# Patient Record
Sex: FEMALE | Race: WHITE | NOT HISPANIC OR LATINO | Employment: OTHER | URBAN - METROPOLITAN AREA
[De-identification: names, ages, dates, MRNs, and addresses within clinical notes are randomized per-mention and may not be internally consistent; named-entity substitution may affect disease eponyms.]

---

## 2023-02-15 PROBLEM — F32.A DEPRESSION: Status: ACTIVE | Noted: 2023-02-15

## 2023-02-15 PROBLEM — G89.29 CHRONIC BACK PAIN: Status: ACTIVE | Noted: 2023-02-15

## 2023-02-15 PROBLEM — E78.5 ELEVATED LIPIDS: Status: ACTIVE | Noted: 2023-02-15

## 2023-02-15 PROBLEM — M54.50 CHRONIC BILATERAL LOW BACK PAIN WITHOUT SCIATICA: Status: ACTIVE | Noted: 2023-02-15

## 2023-02-15 PROBLEM — M54.9 CHRONIC BACK PAIN: Status: ACTIVE | Noted: 2023-02-15

## 2023-02-15 PROBLEM — H43.399 FLOATERS: Status: ACTIVE | Noted: 2023-02-15

## 2023-02-15 PROBLEM — M54.6 THORACIC BACK PAIN: Status: ACTIVE | Noted: 2023-02-15

## 2023-02-15 PROBLEM — Z96.1 PSEUDOPHAKIA OF BOTH EYES: Status: ACTIVE | Noted: 2023-02-15

## 2023-02-15 PROBLEM — G89.29 CHRONIC BILATERAL LOW BACK PAIN WITHOUT SCIATICA: Status: ACTIVE | Noted: 2023-02-15

## 2023-02-15 PROBLEM — R92.30 DENSE BREASTS: Status: ACTIVE | Noted: 2023-02-15

## 2023-02-15 PROBLEM — H43.813 PVD (POSTERIOR VITREOUS DETACHMENT), BOTH EYES: Status: ACTIVE | Noted: 2023-02-15

## 2023-02-15 PROBLEM — H04.123 DRY EYES, BILATERAL: Status: ACTIVE | Noted: 2023-02-15

## 2023-02-15 PROBLEM — R60.0 BILATERAL LOWER EXTREMITY EDEMA: Status: ACTIVE | Noted: 2023-02-15

## 2023-02-15 PROBLEM — R92.2 DENSE BREASTS: Status: ACTIVE | Noted: 2023-02-15

## 2023-02-15 PROBLEM — H52.00 HYPEROPIA: Status: ACTIVE | Noted: 2023-02-15

## 2023-02-15 PROBLEM — R53.83 FATIGUE: Status: ACTIVE | Noted: 2023-02-15

## 2023-02-15 RX ORDER — VENLAFAXINE HYDROCHLORIDE 150 MG/1
1 CAPSULE, EXTENDED RELEASE ORAL DAILY
COMMUNITY
Start: 2020-10-22 | End: 2024-01-26 | Stop reason: SDUPTHER

## 2023-02-15 RX ORDER — MAGNESIUM OXIDE 420 MG/1
1 TABLET ORAL NIGHTLY
COMMUNITY
Start: 2021-09-14 | End: 2024-03-29 | Stop reason: ALTCHOICE

## 2023-03-13 DIAGNOSIS — R53.83 FATIGUE, UNSPECIFIED TYPE: ICD-10-CM

## 2023-03-13 DIAGNOSIS — E78.5 ELEVATED LIPIDS: ICD-10-CM

## 2023-03-29 ENCOUNTER — OFFICE VISIT (OUTPATIENT)
Dept: PRIMARY CARE | Facility: CLINIC | Age: 73
End: 2023-03-29
Payer: MEDICARE

## 2023-03-29 VITALS
SYSTOLIC BLOOD PRESSURE: 150 MMHG | OXYGEN SATURATION: 95 % | HEIGHT: 67 IN | BODY MASS INDEX: 37.64 KG/M2 | RESPIRATION RATE: 16 BRPM | WEIGHT: 239.8 LBS | DIASTOLIC BLOOD PRESSURE: 100 MMHG | HEART RATE: 95 BPM

## 2023-03-29 DIAGNOSIS — R42 VERTIGO: Primary | ICD-10-CM

## 2023-03-29 DIAGNOSIS — Z00.00 ROUTINE GENERAL MEDICAL EXAMINATION AT HEALTH CARE FACILITY: ICD-10-CM

## 2023-03-29 DIAGNOSIS — I10 PRIMARY HYPERTENSION: ICD-10-CM

## 2023-03-29 DIAGNOSIS — D22.9 BENIGN MOLE: ICD-10-CM

## 2023-03-29 PROCEDURE — 3080F DIAST BP >= 90 MM HG: CPT | Performed by: INTERNAL MEDICINE

## 2023-03-29 PROCEDURE — 1159F MED LIST DOCD IN RCRD: CPT | Performed by: INTERNAL MEDICINE

## 2023-03-29 PROCEDURE — 1160F RVW MEDS BY RX/DR IN RCRD: CPT | Performed by: INTERNAL MEDICINE

## 2023-03-29 PROCEDURE — G0439 PPPS, SUBSEQ VISIT: HCPCS | Performed by: INTERNAL MEDICINE

## 2023-03-29 PROCEDURE — 3077F SYST BP >= 140 MM HG: CPT | Performed by: INTERNAL MEDICINE

## 2023-03-29 RX ORDER — AMLODIPINE BESYLATE 5 MG/1
5 TABLET ORAL DAILY
Qty: 90 TABLET | Refills: 3 | Status: SHIPPED | OUTPATIENT
Start: 2023-03-29 | End: 2023-06-19 | Stop reason: SDUPTHER

## 2023-03-29 RX ORDER — DEXTROMETHORPHAN HYDROBROMIDE, GUAIFENESIN 5; 100 MG/5ML; MG/5ML
650 LIQUID ORAL 2 TIMES DAILY
COMMUNITY

## 2023-03-29 RX ORDER — MELOXICAM 15 MG/1
15 TABLET ORAL DAILY
COMMUNITY
End: 2024-01-09 | Stop reason: WASHOUT

## 2023-03-29 ASSESSMENT — ENCOUNTER SYMPTOMS
LOSS OF SENSATION IN FEET: 0
OCCASIONAL FEELINGS OF UNSTEADINESS: 0
DEPRESSION: 0

## 2023-03-29 NOTE — PATIENT INSTRUCTIONS

## 2023-03-29 NOTE — PROGRESS NOTES
"Subjective   Reason for Visit: Tara Clifford is an 72 y.o. female here for a Medicare Wellness visit.      ATS-Effexor XR-150 mg daily    HLD    Degenerative Arthritis             HPI      No falls in the past 12 months    Patient feels safe at home    Patient is able to perform ADL    No symptoms of depression within the past two weeks    Patient has a living will     Patient Care Team:  Benson Arcos MD as PCP - General  Benson Arcos MD as PCP - [employer] PCP     Review of Systems      No Fever/chills/headaches/dizziness/chest pains/ shortness of breath/palpitations/Nausea/vomiting/diarrhea/ constipation/urine frequency/blood in urine.      Objective   Vitals:  Pulse 95   Resp 16   Ht 1.689 m (5' 6.5\")   Wt 109 kg (239 lb 12.8 oz)   SpO2 95%   BMI 38.12 kg/m²       Physical Exam    No JVP elevation. No palpable Lymph Nodes. No Thyromegaly    CVS-NL S1/S2 . No MRG    Lungs-CTA. B/S= B/L    Abdomen-Soft, Non-tender. No masses or HSM    Extremities: No C/C/E      Assessment/Plan   Problem List Items Addressed This Visit    None     ATS-Effexor XR-150 mg daily    HTN -Goal < 130/80    Start Amlodipine 5 mg daily    HLD    Degenerative Arthritis- ? PT Meloxicam 15 mg PRN    Dermatology Exam    PNA /Shingrix Rx completed    Continue with current treatment.    Follow up in 12 months/PRN       Patient was identified as a fall risk. Risk prevention instructions provided.  "

## 2023-03-29 NOTE — PROGRESS NOTES
"Subjective   Patient ID: Tara Clifford is a 72 y.o. female who presents for Annual Exam (Medicare Annual).    HPI         Review of Systems    Objective   Pulse 95   Resp 16   Ht 1.689 m (5' 6.5\")   Wt 109 kg (239 lb 12.8 oz)   SpO2 95%   BMI 38.12 kg/m²     Physical Exam    Assessment/Plan          "

## 2023-06-19 DIAGNOSIS — I10 PRIMARY HYPERTENSION: ICD-10-CM

## 2023-06-19 RX ORDER — AMLODIPINE BESYLATE 5 MG/1
5 TABLET ORAL DAILY
Qty: 90 TABLET | Refills: 0 | Status: SHIPPED | OUTPATIENT
Start: 2023-06-19 | End: 2023-09-28 | Stop reason: SDUPTHER

## 2023-09-09 LAB
NIL(NEG) CONTROL SPOT COUNT: NORMAL
PANEL A SPOT COUNT: 1
PANEL B SPOT COUNT: 0
POS CONTROL SPOT COUNT: NORMAL
T-SPOT. TB INTERPRETATION: NEGATIVE

## 2023-09-25 ENCOUNTER — LAB (OUTPATIENT)
Dept: LAB | Facility: LAB | Age: 73
End: 2023-09-25
Payer: MEDICARE

## 2023-09-25 DIAGNOSIS — E78.5 ELEVATED LIPIDS: ICD-10-CM

## 2023-09-25 DIAGNOSIS — R53.83 FATIGUE, UNSPECIFIED TYPE: ICD-10-CM

## 2023-09-25 LAB
ALANINE AMINOTRANSFERASE (SGPT) (U/L) IN SER/PLAS: 15 U/L (ref 7–45)
ALBUMIN (G/DL) IN SER/PLAS: 4.4 G/DL (ref 3.4–5)
ALKALINE PHOSPHATASE (U/L) IN SER/PLAS: 116 U/L (ref 33–136)
ANION GAP IN SER/PLAS: 12 MMOL/L (ref 10–20)
ASPARTATE AMINOTRANSFERASE (SGOT) (U/L) IN SER/PLAS: 17 U/L (ref 9–39)
BASOPHILS (10*3/UL) IN BLOOD BY AUTOMATED COUNT: 0.05 X10E9/L (ref 0–0.1)
BASOPHILS/100 LEUKOCYTES IN BLOOD BY AUTOMATED COUNT: 1.1 % (ref 0–2)
BILIRUBIN TOTAL (MG/DL) IN SER/PLAS: 0.4 MG/DL (ref 0–1.2)
CALCIUM (MG/DL) IN SER/PLAS: 9.2 MG/DL (ref 8.6–10.3)
CARBON DIOXIDE, TOTAL (MMOL/L) IN SER/PLAS: 28 MMOL/L (ref 21–32)
CHLORIDE (MMOL/L) IN SER/PLAS: 102 MMOL/L (ref 98–107)
CHOLESTEROL (MG/DL) IN SER/PLAS: 235 MG/DL (ref 0–199)
CHOLESTEROL IN HDL (MG/DL) IN SER/PLAS: 62.6 MG/DL
CHOLESTEROL/HDL RATIO: 3.8
CREATININE (MG/DL) IN SER/PLAS: 0.68 MG/DL (ref 0.5–1.05)
EOSINOPHILS (10*3/UL) IN BLOOD BY AUTOMATED COUNT: 0.07 X10E9/L (ref 0–0.4)
EOSINOPHILS/100 LEUKOCYTES IN BLOOD BY AUTOMATED COUNT: 1.5 % (ref 0–6)
ERYTHROCYTE DISTRIBUTION WIDTH (RATIO) BY AUTOMATED COUNT: 13 % (ref 11.5–14.5)
ERYTHROCYTE MEAN CORPUSCULAR HEMOGLOBIN CONCENTRATION (G/DL) BY AUTOMATED: 31.2 G/DL (ref 32–36)
ERYTHROCYTE MEAN CORPUSCULAR VOLUME (FL) BY AUTOMATED COUNT: 90 FL (ref 80–100)
ERYTHROCYTES (10*6/UL) IN BLOOD BY AUTOMATED COUNT: 5.25 X10E12/L (ref 4–5.2)
GFR FEMALE: >90 ML/MIN/1.73M2
GLUCOSE (MG/DL) IN SER/PLAS: 90 MG/DL (ref 74–99)
HEMATOCRIT (%) IN BLOOD BY AUTOMATED COUNT: 47.4 % (ref 36–46)
HEMOGLOBIN (G/DL) IN BLOOD: 14.8 G/DL (ref 12–16)
IMMATURE GRANULOCYTES/100 LEUKOCYTES IN BLOOD BY AUTOMATED COUNT: 0.2 % (ref 0–0.9)
LDL: 124 MG/DL (ref 0–99)
LEUKOCYTES (10*3/UL) IN BLOOD BY AUTOMATED COUNT: 4.8 X10E9/L (ref 4.4–11.3)
LYMPHOCYTES (10*3/UL) IN BLOOD BY AUTOMATED COUNT: 0.73 X10E9/L (ref 0.8–3)
LYMPHOCYTES/100 LEUKOCYTES IN BLOOD BY AUTOMATED COUNT: 15.4 % (ref 13–44)
MONOCYTES (10*3/UL) IN BLOOD BY AUTOMATED COUNT: 0.35 X10E9/L (ref 0.05–0.8)
MONOCYTES/100 LEUKOCYTES IN BLOOD BY AUTOMATED COUNT: 7.4 % (ref 2–10)
MUCUS, URINE: NORMAL /LPF
NEUTROPHILS (10*3/UL) IN BLOOD BY AUTOMATED COUNT: 3.54 X10E9/L (ref 1.6–5.5)
NEUTROPHILS/100 LEUKOCYTES IN BLOOD BY AUTOMATED COUNT: 74.4 % (ref 40–80)
NON HDL CHOLESTEROL: 172 MG/DL
PLATELETS (10*3/UL) IN BLOOD AUTOMATED COUNT: 193 X10E9/L (ref 150–450)
POTASSIUM (MMOL/L) IN SER/PLAS: 4.5 MMOL/L (ref 3.5–5.3)
PROTEIN TOTAL: 6.6 G/DL (ref 6.4–8.2)
RBC, URINE: 2 /HPF (ref 0–5)
SODIUM (MMOL/L) IN SER/PLAS: 137 MMOL/L (ref 136–145)
SQUAMOUS EPITHELIAL CELLS, URINE: <1 /HPF
THYROTROPIN (MIU/L) IN SER/PLAS BY DETECTION LIMIT <= 0.05 MIU/L: 2 MIU/L (ref 0.44–3.98)
TRIGLYCERIDE (MG/DL) IN SER/PLAS: 240 MG/DL (ref 0–149)
UREA NITROGEN (MG/DL) IN SER/PLAS: 15 MG/DL (ref 6–23)
VLDL: 48 MG/DL (ref 0–40)
WBC, URINE: 3 /HPF (ref 0–5)

## 2023-09-25 PROCEDURE — 81001 URINALYSIS AUTO W/SCOPE: CPT

## 2023-09-25 PROCEDURE — 80061 LIPID PANEL: CPT

## 2023-09-25 PROCEDURE — 85025 COMPLETE CBC W/AUTO DIFF WBC: CPT

## 2023-09-25 PROCEDURE — 36415 COLL VENOUS BLD VENIPUNCTURE: CPT

## 2023-09-25 PROCEDURE — 84443 ASSAY THYROID STIM HORMONE: CPT

## 2023-09-25 PROCEDURE — 80053 COMPREHEN METABOLIC PANEL: CPT

## 2023-09-28 DIAGNOSIS — I10 PRIMARY HYPERTENSION: ICD-10-CM

## 2023-09-28 RX ORDER — AMLODIPINE BESYLATE 5 MG/1
5 TABLET ORAL DAILY
Qty: 90 TABLET | Refills: 0 | Status: SHIPPED | OUTPATIENT
Start: 2023-09-28 | End: 2023-12-27 | Stop reason: SDUPTHER

## 2023-09-29 ENCOUNTER — OFFICE VISIT (OUTPATIENT)
Dept: PRIMARY CARE | Facility: CLINIC | Age: 73
End: 2023-09-29
Payer: MEDICARE

## 2023-09-29 VITALS
OXYGEN SATURATION: 99 % | WEIGHT: 234.8 LBS | RESPIRATION RATE: 18 BRPM | HEART RATE: 81 BPM | BODY MASS INDEX: 37.61 KG/M2

## 2023-09-29 DIAGNOSIS — Z00.00 ROUTINE GENERAL MEDICAL EXAMINATION AT A HEALTH CARE FACILITY: Primary | ICD-10-CM

## 2023-09-29 DIAGNOSIS — Z23 ENCOUNTER FOR IMMUNIZATION: ICD-10-CM

## 2023-09-29 DIAGNOSIS — M17.9 OSTEOARTHRITIS OF KNEE, UNSPECIFIED LATERALITY, UNSPECIFIED OSTEOARTHRITIS TYPE: ICD-10-CM

## 2023-09-29 PROCEDURE — G0008 ADMIN INFLUENZA VIRUS VAC: HCPCS | Performed by: INTERNAL MEDICINE

## 2023-09-29 PROCEDURE — G0439 PPPS, SUBSEQ VISIT: HCPCS | Performed by: INTERNAL MEDICINE

## 2023-09-29 PROCEDURE — 1036F TOBACCO NON-USER: CPT | Performed by: INTERNAL MEDICINE

## 2023-09-29 PROCEDURE — 1159F MED LIST DOCD IN RCRD: CPT | Performed by: INTERNAL MEDICINE

## 2023-09-29 PROCEDURE — 1126F AMNT PAIN NOTED NONE PRSNT: CPT | Performed by: INTERNAL MEDICINE

## 2023-09-29 PROCEDURE — 90662 IIV NO PRSV INCREASED AG IM: CPT | Performed by: INTERNAL MEDICINE

## 2023-09-29 PROCEDURE — 1160F RVW MEDS BY RX/DR IN RCRD: CPT | Performed by: INTERNAL MEDICINE

## 2023-09-29 RX ORDER — IBUPROFEN 100 MG/5ML
SUSPENSION, ORAL (FINAL DOSE FORM) ORAL EVERY 24 HOURS
COMMUNITY

## 2023-09-29 RX ORDER — GLUCOSAMINE/CHONDR SU A SOD 750-600 MG
1 TABLET ORAL
COMMUNITY
Start: 2015-06-06

## 2023-09-29 RX ORDER — BUTYROSPERMUM PARKII(SHEA BUTTER), SIMMONDSIA CHINENSIS (JOJOBA) SEED OIL, ALOE BARBADENSIS LEAF EXTRACT .01; 1; 3.5 G/100G; G/100G; G/100G
1 LIQUID TOPICAL EVERY 12 HOURS
COMMUNITY
End: 2024-01-09 | Stop reason: WASHOUT

## 2023-09-29 ASSESSMENT — ENCOUNTER SYMPTOMS
DEPRESSION: 0
LOSS OF SENSATION IN FEET: 0
OCCASIONAL FEELINGS OF UNSTEADINESS: 0

## 2023-09-29 ASSESSMENT — PATIENT HEALTH QUESTIONNAIRE - PHQ9
1. LITTLE INTEREST OR PLEASURE IN DOING THINGS: SEVERAL DAYS
10. IF YOU CHECKED OFF ANY PROBLEMS, HOW DIFFICULT HAVE THESE PROBLEMS MADE IT FOR YOU TO DO YOUR WORK, TAKE CARE OF THINGS AT HOME, OR GET ALONG WITH OTHER PEOPLE: SOMEWHAT DIFFICULT
2. FEELING DOWN, DEPRESSED OR HOPELESS: NOT AT ALL
SUM OF ALL RESPONSES TO PHQ9 QUESTIONS 1 AND 2: 1

## 2023-09-29 NOTE — PATIENT INSTRUCTIONS

## 2023-09-29 NOTE — PROGRESS NOTES
Subjective   Reason for Visit: Tara Clifford is an 73 y.o. female here for a Medicare Wellness visit.     PMH:    HTN    OA    ATS    Knee arthralgia             Reviewed all medications by prescribing practitioner or clinical pharmacist (such as prescriptions, OTCs, herbal therapies and supplements) and documented in the medical record.    HPI    Patient Care Team:  Benson Arcos MD as PCP - General  Benson Arcos MD as PCP - [employer]age PCP     Review of Systems      No Fever/chills/headaches/dizziness/chest pains/ shortness of breath/palpitations/Nausea/vomiting/diarrhea/ constipation/urine frequency/blood in urine.      Objective   Vitals:  Pulse 81   Resp 18   Wt 107 kg (234 lb 12.8 oz)   SpO2 99%   BMI 37.61 kg/m²       Physical Exam    No JVP elevation. No palpable Lymph Nodes. No Thyromegaly    HEENT-Neg    CVS-NL S1/S2 . No MRG    Lungs-CTA. B/S= B/L    Abdomen-Soft, Non-tender. No masses or HSM    Extremities: No C/C/E      Assessment/Plan   Problem List Items Addressed This Visit    None  Visit Diagnoses       Encounter for immunization        Relevant Orders    Flu vaccine, quadrivalent, high-dose, preservative free, age 65y+ (FLUZONE)        HTN    OA    ATS    Knee arthralgia    Plan:    PT    Labs    CRC-2021    Mammogram-2023    Flu/Covid   \]0-=ol Vax    ? RSV    Continue with current Rx    Follow up/ Call with any concerns    Follow up in 12 months /PRN         Patient was identified as a fall risk. Risk prevention instructions provided.

## 2023-12-27 ENCOUNTER — TELEPHONE (OUTPATIENT)
Dept: PRIMARY CARE | Facility: CLINIC | Age: 73
End: 2023-12-27
Payer: MEDICARE

## 2023-12-27 DIAGNOSIS — I10 PRIMARY HYPERTENSION: ICD-10-CM

## 2023-12-27 RX ORDER — AMLODIPINE BESYLATE 5 MG/1
5 TABLET ORAL DAILY
Qty: 90 TABLET | Refills: 3 | Status: SHIPPED | OUTPATIENT
Start: 2023-12-27 | End: 2024-12-26

## 2024-01-04 ENCOUNTER — OFFICE VISIT (OUTPATIENT)
Dept: DERMATOLOGY | Facility: CLINIC | Age: 74
End: 2024-01-04
Payer: MEDICARE

## 2024-01-04 DIAGNOSIS — D48.5 NEOPLASM OF UNCERTAIN BEHAVIOR OF SKIN: Primary | ICD-10-CM

## 2024-01-04 DIAGNOSIS — R20.2 NOTALGIA PARESTHETICA: ICD-10-CM

## 2024-01-04 DIAGNOSIS — L81.4 LENTIGO: ICD-10-CM

## 2024-01-04 DIAGNOSIS — L82.1 SEBORRHEIC KERATOSIS: ICD-10-CM

## 2024-01-04 DIAGNOSIS — Z12.83 SCREENING EXAM FOR SKIN CANCER: ICD-10-CM

## 2024-01-04 DIAGNOSIS — D22.9 MULTIPLE BENIGN NEVI: ICD-10-CM

## 2024-01-04 DIAGNOSIS — D18.01 HEMANGIOMA OF SKIN: ICD-10-CM

## 2024-01-04 PROCEDURE — 11102 TANGNTL BX SKIN SINGLE LES: CPT | Performed by: DERMATOLOGY

## 2024-01-04 PROCEDURE — 1036F TOBACCO NON-USER: CPT | Performed by: DERMATOLOGY

## 2024-01-04 PROCEDURE — 1159F MED LIST DOCD IN RCRD: CPT | Performed by: DERMATOLOGY

## 2024-01-04 PROCEDURE — 88305 TISSUE EXAM BY PATHOLOGIST: CPT | Performed by: DERMATOLOGY

## 2024-01-04 PROCEDURE — 99203 OFFICE O/P NEW LOW 30 MIN: CPT | Performed by: DERMATOLOGY

## 2024-01-04 PROCEDURE — 88305 TISSUE EXAM BY PATHOLOGIST: CPT | Mod: TC,DER | Performed by: DERMATOLOGY

## 2024-01-04 PROCEDURE — 1126F AMNT PAIN NOTED NONE PRSNT: CPT | Performed by: DERMATOLOGY

## 2024-01-04 RX ORDER — CALCIUM CARBONATE/VITAMIN D3 500-10/5ML
LIQUID (ML) ORAL
COMMUNITY

## 2024-01-04 RX ORDER — MULTIVIT-MIN/IRON/FOLIC ACID/K 18-600-40
CAPSULE ORAL
COMMUNITY

## 2024-01-04 RX ORDER — ACETAMINOPHEN AND DIPHENHYDRAMINE HYDROCHLORIDE 500; 25 MG/1; MG/1
TABLET, FILM COATED ORAL EVERY 24 HOURS
COMMUNITY

## 2024-01-04 ASSESSMENT — DERMATOLOGY PATIENT ASSESSMENT
DO YOU USE SUNSCREEN: DAILY
HAVE YOU HAD OR DO YOU HAVE VASCULAR DISEASE: NO
HAVE YOU HAD OR DO YOU HAVE A STAPH INFECTION: NO
ARE YOU ON BIRTH CONTROL: NO
ARE YOU TRYING TO GET PREGNANT: NO
ARE YOU AN ORGAN TRANSPLANT RECIPIENT: NO
DO YOU HAVE ANY NEW OR CHANGING LESIONS: NO
DO YOU HAVE IRREGULAR MENSTRUAL CYCLES: NO
DO YOU USE A TANNING BED: NO

## 2024-01-04 ASSESSMENT — DERMATOLOGY QUALITY OF LIFE (QOL) ASSESSMENT
RATE HOW BOTHERED YOU ARE BY EFFECTS OF YOUR SKIN PROBLEMS ON YOUR ACTIVITIES (EG, GOING OUT, ACCOMPLISHING WHAT YOU WANT, WORK ACTIVITIES OR YOUR RELATIONSHIPS WITH OTHERS): 1
RATE HOW EMOTIONALLY BOTHERED YOU ARE BY YOUR SKIN PROBLEM (FOR EXAMPLE, WORRY, EMBARRASSMENT, FRUSTRATION): 1
ARE THERE EXCLUSIONS OR EXCEPTIONS FOR THE QUALITY OF LIFE ASSESSMENT: NO
DATE THE QUALITY-OF-LIFE ASSESSMENT WAS COMPLETED: 66843
RATE HOW BOTHERED YOU ARE BY SYMPTOMS OF YOUR SKIN PROBLEM (EG, ITCHING, STINGING BURNING, HURTING OR SKIN IRRITATION): 1

## 2024-01-04 ASSESSMENT — ITCH NUMERIC RATING SCALE: HOW SEVERE IS YOUR ITCHING?: 4

## 2024-01-04 ASSESSMENT — PATIENT GLOBAL ASSESSMENT (PGA): PATIENT GLOBAL ASSESSMENT: PATIENT GLOBAL ASSESSMENT:  1 - CLEAR

## 2024-01-04 NOTE — PROGRESS NOTES
Subjective     Tara Clifford is a 73 y.o. female who presents for the following: Skin Check.     Review of Systems:  No other skin or systemic complaints other than what is documented elsewhere in the note.    The following portions of the chart were reviewed this encounter and updated as appropriate:         Skin Cancer History  No skin cancer on file.      Specialty Problems    None       Objective   Well appearing patient in no apparent distress; mood and affect are within normal limits.    A full examination was performed including scalp, head, eyes, ears, nose, lips, neck, chest, axillae, abdomen, back, buttocks, bilateral upper extremities, bilateral lower extremities, hands, feet, fingers, toes, fingernails, and toenails. All findings within normal limits unless otherwise noted below.    Assessment/Plan   1. Neoplasm of uncertain behavior of skin  Left Alar Crease  0.3x0.4 cm pink shiny papule with arborizing telangiectasia    Present a few years, bleeds and then heals          Lesion biopsy  Type of biopsy: tangential    Informed consent: discussed and consent obtained    Timeout: patient name, date of birth, surgical site, and procedure verified    Procedure prep:  Patient was prepped and draped  Anesthesia: the lesion was anesthetized in a standard fashion    Anesthetic:  1% lidocaine w/ epinephrine 1-100,000 local infiltration  Instrument used: DermaBlade    Hemostasis achieved with: aluminum chloride    Outcome: patient tolerated procedure well    Post-procedure details: sterile dressing applied and wound care instructions given    Dressing type: petrolatum and bandage      Staff Communication: Dermatology Local Anesthesia: 1 % Lidocaine / Epinephrine - Amount: 1.5 ml    Specimen 1 - Dermatopathology- DERM LAB  Differential Diagnosis: basal cell carcinoma   Check Margins Yes/No?:    Comments:    Dermpath Lab: Routine Histopathology (formalin-fixed tissue)    2. Multiple benign nevi  Brown and tan macules  and papules with reassuring findings on dermoscopy    -These lesions have benign, reassuring patterns on dermoscopy  -Recommend continued self observation, and to contact the office if any changes in nevi are noticed    3. Lentigo  Tan macules    -Benign appearing on exam  -Reassurance, recommend observation    4. Seborrheic keratosis  Stuck on, waxy macule(s)/papule(s)/plaque(s) with comedo-like openings and milia like cysts    -Discussed the nature of the diagnosis  -Reassurance, recommend continued observation    5. Hemangioma of skin (2)  Generalized, Left Ala Nasi  Decker red papules    1 mm red macule on nasal rim; benign vascular pattern, recently bled once    -Discussed the nature of the diagnosis  -Reassurance, recommend continued observation    6. Notalgia paresthetica  Mid Back  No primary skin findings    -Discussed nature of the condition  -Notalgia paresthetica is a benign condition caused by irritation to cutaneous nerve causing itching. This often occurs on one side of the back near the scapula.  - see AVS for suggestions    7. Screening exam for skin cancer    Full body skin exam  -No lesions concerning for malignancy on the remainder the skin exam today   - The ugly duckling sign was discussed. Monitor for any skin lesions that are different in color, shape, or size than others on body  -Sun protection was discussed. Recommend SPF 30+, hats with brims, sun protective clothing, and avoiding sun exposure between 10 AM and 2 PM whenever possible  -Recommend regular skin exams or sooner if new or changing lesions       Related Procedures  Follow Up In Dermatology - Established Patient        Follow up 6-12 months for Full Skin Exam (6 months if biopsy shows skin cancer)

## 2024-01-04 NOTE — PATIENT INSTRUCTIONS
"Notalgia paresthetica - \"pinched nerves sending faulty messages\"    - Ice packs  - Sarna anti-itch lotion with menthol  - CeraVe itch relief - \"Red label\" - active ingrediemtn pramoxine  - posture, physical therapy  "

## 2024-01-09 LAB
LABORATORY COMMENT REPORT: NORMAL
PATH REPORT.FINAL DX SPEC: NORMAL
PATH REPORT.GROSS SPEC: NORMAL
PATH REPORT.MICROSCOPIC SPEC OTHER STN: NORMAL
PATH REPORT.RELEVANT HX SPEC: NORMAL
PATH REPORT.TOTAL CANCER: NORMAL

## 2024-01-11 DIAGNOSIS — C44.91 BASAL CELL CARCINOMA (BCC), UNSPECIFIED SITE: Primary | ICD-10-CM

## 2024-01-26 DIAGNOSIS — I10 PRIMARY HYPERTENSION: ICD-10-CM

## 2024-01-28 RX ORDER — VENLAFAXINE HYDROCHLORIDE 150 MG/1
150 CAPSULE, EXTENDED RELEASE ORAL DAILY
Qty: 90 CAPSULE | Refills: 3 | Status: SHIPPED | OUTPATIENT
Start: 2024-01-28 | End: 2024-03-29 | Stop reason: SDUPTHER

## 2024-03-22 ENCOUNTER — PROCEDURE VISIT (OUTPATIENT)
Dept: DERMATOLOGY | Facility: CLINIC | Age: 74
End: 2024-03-22
Payer: MEDICARE

## 2024-03-22 VITALS — SYSTOLIC BLOOD PRESSURE: 124 MMHG | HEART RATE: 96 BPM | DIASTOLIC BLOOD PRESSURE: 84 MMHG

## 2024-03-22 DIAGNOSIS — C44.311 BASAL CELL CARCINOMA (BCC) OF SKIN OF NOSE: ICD-10-CM

## 2024-03-22 PROCEDURE — 99214 OFFICE O/P EST MOD 30 MIN: CPT | Performed by: DERMATOLOGY

## 2024-03-22 PROCEDURE — 17312 MOHS ADDL STAGE: CPT | Performed by: DERMATOLOGY

## 2024-03-22 PROCEDURE — 17311 MOHS 1 STAGE H/N/HF/G: CPT | Performed by: DERMATOLOGY

## 2024-03-22 NOTE — PROGRESS NOTES
Mohs Surgery Operative Note    Date of Surgery:  3/22/2024  Surgeon:  Cady Briones MD  Office Location: [address]   [facility] [address]  [facility] [address]  Dept: [phone]  Dept Fax: [phone]  Referring Provider: Naheed Diaz MD  [address] [place] [address] Dr Clare Jaramillo, [place] [id number]      Assessment/Plan   Pre-procedure:   Obtained informed consent: written from patient  The surgical site was identified and confirmed with the patient.     Intra-operative:   Audible time out called at : 8:26 AM 03/22/24  by: Nawaf Galvez MA   Verified patient name, birthdate, site, specimen bottle label & requisition.    The planned procedure(s) was again reviewed with the patient. The risks of bleeding, infection, nerve damage and scarring were reviewed. Written authorization was obtained. The patient identity, surgical site, and planned procedure(s) were verified. The provider acted as both surgeon and pathologist.     Basal cell carcinoma (BCC) of skin of nose  Left Alar Crease  Mohs surgery  Consent obtained: written    Universal Protocol:  Procedure explained and questions answered to patient or proxy's satisfaction: Yes    Test results available and properly labeled: Yes    Pathology report reviewed: Yes    External notes reviewed: Yes    Photo or diagram used for site identification: Yes    Site/side marked: Yes    Slide independently reviewed by Mohs surgeon: Yes    Immediately prior to procedure a time out was called: Yes    Patient identity confirmed: verbally with patient  Preparation: Patient was prepped and draped in usual sterile fashion      Anticoagulation:  Is the patient taking prescription anticoagulant and/or aspirin prescribed/recommended by a physician? No    Was the anticoagulation regimen changed prior to Mohs? No      Anesthesia:  Anesthesia method: local infiltration  Local anesthetic: lidocaine 2% WITH epi    Procedure  Details:  Biopsy accession number: [id number]  Date of biopsy: 1/4/2024  Pre-Op diagnosis: basal cell carcinoma  BCC subtype: nodular  Surgery side: left  Surgical site (from skin exam): Left Alar Crease  Pre-operative length (cm): 0.6  Pre-operative width (cm): 0.5  Indications for Mohs surgery: anatomic location where tissue conservation is critical  Previously treated? No      Micrographic Surgery Details:  Post-operative length (cm): 0.8  Post-operative width (cm): 0.7  Number of Mohs stages: 3    Stage 1     Comments: The patient was brought into the operating room and placed in the procedure chair in the appropriate position.  The area positive by previous biopsy was identified and confirmed with the patient. The area of clinically obvious tumor was debulked using a curette and/or scalpel as needed. An incision was made following the Mohs approach through the skin. The specimen was taken to the lab, divided into 1 piece(s) and appropriately chromacoded and processed.  Tumor was seen on the deep margins as indicated on the on the Mohs map.  Nodular basal cell carcinoma. Histologic examination revealed large tumor aggregates of atypical basaloid cells with peripheral palisading and tumoral clefting.   Tumor features identified on Mohs section: basal carcinoma   Depth of invasion: dermis    Stage 2     Comments: The area of positivity as noted on the Mohs map in the previous stage was identified and removed using the Mohs technique. The specimen was taken to the lab and appropriately chromacoded and processed in 1 piece(s).  Tumor was seen on the deep margins as indicated on the on the Mohs map.  Nodular basal cell carcinoma. Histologic examination revealed large tumor aggregates of atypical basaloid cells with peripheral palisading and tumoral clefting.   Tumor features identified on Mohs section: basal carcinoma  Depth of invasion: dermis    Stage 3     Comments: The area of positivity as noted on the Mohs map  in the previous stage was identified and removed using the Mohs technique. The specimen was taken to the lab and appropriately chromacoded and processed in 1 piece(s).  Tumor features identified on Mohs section: no tumor identified  Depth of defect: skeletal muscle    Patient tolerance of procedure: tolerated well, no immediate complications    Reconstruction:  Was the defect reconstructed? Yes    Was reconstruction performed by the same Mohs surgeon? Yes    When was reconstruction performed? same day  Type of reconstruction: linear  Linear reconstruction: complex  Subcutaneous Layers (Deep Stitches)   Suture size:  5-0  Suture type:  Vicryl  Stitches:  Buried vertical mattress  Fine/surface layer approximation (top stitches)   Epidermal/Superficial suture size:  5-0  Epidermal/Superficial suture type:  Fast-absorbing gut  Stitches: simple running    Hemostasis achieved with: suture, pressure and electrodesiccation  Outcome: patient tolerated procedure well with no complications    Post-procedure details: sterile dressing applied and wound care instructions given      Complex Linear Repair - Wide Undermining:  Given the location and size of the defect, it was determined that a complex layered linear closure was required to restore normal anatomy and function. The repair was considered complex because extensive undermining was required and performed. The amount of undermining performed was greater than the maximum width of the defect as measured perpendicular to the closure line along at least one entire edge of the defect. Standing cutaneous cones were removed using Burow's triangles. The wound edges were brought into close approximation with buried vertical mattress sutures. The remainder of the wound was then closed with epidermal top sutures.    The final repair measured 1.4 cm              Wound care was discussed, and the patient was given written post-operative wound care instructions.      The patient will  follow up with Cady Briones MD as needed for any post operative problems or concerns, and will follow up with their primary dermatologist as scheduled.

## 2024-03-22 NOTE — PROGRESS NOTES
Office Visit Note  Date: 3/22/2024  Surgeon:  Cady Briones MD  Office Location: [address]  [facility] [address]  Dept: [phone]  Dept Fax: [phone]  Referring Provider: Naheed Diaz MD  [address] [place] [address]   [facility] [address]  [place],  [facility]    Subjective   Tara Clifford is a 73 y.o. female who presents for the following: MOHS Surgery    According to the patient, the lesion has been present for approximately 5 years at the time of diagnosis.  The lesion is crusting, non-healing and bleeding.  The lesion has not been treated previously.    The patient does not have a pacemaker / defibrillator.  The patient does not have a heart valve / joint replacement.    The patient is not on blood thinners.  The patient does not have a history of hepatitis B or C.  The patient does not have a history of HIV.  The patient does not have a history of immunosuppression (e.g. organ transplantation, malignancy, medications)    Review of Systems:  No other skin or systemic complaints other than what is documented elsewhere in the note.    MEDICAL HISTORY: clinically relevant history including significant past medical history, medications and allergies was reviewed and documented in Epic.    Objective   Well appearing patient in no apparent distress; mood and affect are within normal limits.  Vital signs: See record.  Noted on the Left Alar Crease  Is a 0.6 x 0.5 cm scar      The patient confirmed the identified site.    Discussion:  The nature of the diagnosis was explained. The lesion is a skin cancer.  It has a risk of local growth and distant spread. The condition is associated with sun exposure.  Warning signs of non-melanoma skin cancer discussed. Patient was instructed to perform monthly self skin examination.  We recommended that the patient have regular full skin exams given an increased risk of subsequent skin cancers. The patient was  instructed to use sun protective behaviors including use of broad spectrum sunscreens and sun protective clothing to reduce risk of skin cancers.      Risks, benefits, side effects of Mohs surgery were discussed with patient and the patient voiced understanding.  It was explained that even though the cure rate of Mohs is very high it is not 100%. Risks of surgery including but not limited to bleeding, infection, numbness, nerve damage, and scar were reviewed.  Discussion included wound care requirements, activity restrictions, likely scar outcome and time to heal.     After Mohs surgery, the defect may need to be repaired surgically and the scar may be longer than the original lesion.  Reconstruction options, risks, and benefits were reviewed including second intention healing, linear repair (4-1 ratio was explained), local flaps, skin grafts, cartilage grafts and interpolation flaps (the need for multiple surgeries was explained). Possible outcomes were reviewed including likely scar appearance, failure of flap survival, infection, bleeding and the need for revision surgery.     The pathology was reviewed, the photograph was reviewed, and the referring physician's note was reviewed.    Patient elected for Mohs surgery.

## 2024-03-29 ENCOUNTER — OFFICE VISIT (OUTPATIENT)
Dept: PRIMARY CARE | Facility: CLINIC | Age: 74
End: 2024-03-29
Payer: MEDICARE

## 2024-03-29 ENCOUNTER — LAB (OUTPATIENT)
Dept: LAB | Facility: LAB | Age: 74
End: 2024-03-29
Payer: MEDICARE

## 2024-03-29 VITALS
WEIGHT: 237 LBS | SYSTOLIC BLOOD PRESSURE: 128 MMHG | HEART RATE: 84 BPM | BODY MASS INDEX: 37.96 KG/M2 | OXYGEN SATURATION: 100 % | RESPIRATION RATE: 18 BRPM | DIASTOLIC BLOOD PRESSURE: 88 MMHG

## 2024-03-29 DIAGNOSIS — Z12.31 BREAST CANCER SCREENING BY MAMMOGRAM: ICD-10-CM

## 2024-03-29 DIAGNOSIS — R53.83 FATIGUE, UNSPECIFIED TYPE: ICD-10-CM

## 2024-03-29 DIAGNOSIS — Z00.00 ROUTINE GENERAL MEDICAL EXAMINATION AT HEALTH CARE FACILITY: ICD-10-CM

## 2024-03-29 DIAGNOSIS — R53.83 FATIGUE, UNSPECIFIED TYPE: Primary | ICD-10-CM

## 2024-03-29 DIAGNOSIS — Z00.00 ROUTINE GENERAL MEDICAL EXAMINATION AT A HEALTH CARE FACILITY: ICD-10-CM

## 2024-03-29 DIAGNOSIS — I10 PRIMARY HYPERTENSION: ICD-10-CM

## 2024-03-29 DIAGNOSIS — R40.0 DAYTIME SOMNOLENCE: ICD-10-CM

## 2024-03-29 LAB
ALBUMIN SERPL BCP-MCNC: 4.6 G/DL (ref 3.4–5)
ALP SERPL-CCNC: 127 U/L (ref 33–136)
ALT SERPL W P-5'-P-CCNC: 18 U/L (ref 7–45)
ANION GAP SERPL CALC-SCNC: 14 MMOL/L (ref 10–20)
AST SERPL W P-5'-P-CCNC: 20 U/L (ref 9–39)
BASOPHILS # BLD AUTO: 0.06 X10*3/UL (ref 0–0.1)
BASOPHILS NFR BLD AUTO: 1.2 %
BILIRUB SERPL-MCNC: 0.5 MG/DL (ref 0–1.2)
BUN SERPL-MCNC: 17 MG/DL (ref 6–23)
CALCIUM SERPL-MCNC: 9.8 MG/DL (ref 8.6–10.3)
CHLORIDE SERPL-SCNC: 101 MMOL/L (ref 98–107)
CO2 SERPL-SCNC: 29 MMOL/L (ref 21–32)
CREAT SERPL-MCNC: 0.71 MG/DL (ref 0.5–1.05)
EGFRCR SERPLBLD CKD-EPI 2021: 90 ML/MIN/1.73M*2
EOSINOPHIL # BLD AUTO: 0.07 X10*3/UL (ref 0–0.4)
EOSINOPHIL NFR BLD AUTO: 1.4 %
ERYTHROCYTE [DISTWIDTH] IN BLOOD BY AUTOMATED COUNT: 13.1 % (ref 11.5–14.5)
GLUCOSE SERPL-MCNC: 82 MG/DL (ref 74–99)
HCT VFR BLD AUTO: 47.9 % (ref 36–46)
HCV AB SER QL: NONREACTIVE
HGB BLD-MCNC: 15 G/DL (ref 12–16)
IMM GRANULOCYTES # BLD AUTO: 0.01 X10*3/UL (ref 0–0.5)
IMM GRANULOCYTES NFR BLD AUTO: 0.2 % (ref 0–0.9)
LYMPHOCYTES # BLD AUTO: 0.93 X10*3/UL (ref 0.8–3)
LYMPHOCYTES NFR BLD AUTO: 18.4 %
MCH RBC QN AUTO: 28.3 PG (ref 26–34)
MCHC RBC AUTO-ENTMCNC: 31.3 G/DL (ref 32–36)
MCV RBC AUTO: 90 FL (ref 80–100)
MONOCYTES # BLD AUTO: 0.43 X10*3/UL (ref 0.05–0.8)
MONOCYTES NFR BLD AUTO: 8.5 %
NEUTROPHILS # BLD AUTO: 3.56 X10*3/UL (ref 1.6–5.5)
NEUTROPHILS NFR BLD AUTO: 70.3 %
NRBC BLD-RTO: 0 /100 WBCS (ref 0–0)
PLATELET # BLD AUTO: 259 X10*3/UL (ref 150–450)
POTASSIUM SERPL-SCNC: 4.6 MMOL/L (ref 3.5–5.3)
PROT SERPL-MCNC: 7 G/DL (ref 6.4–8.2)
RBC # BLD AUTO: 5.3 X10*6/UL (ref 4–5.2)
SODIUM SERPL-SCNC: 139 MMOL/L (ref 136–145)
TSH SERPL-ACNC: 2.07 MIU/L (ref 0.44–3.98)
WBC # BLD AUTO: 5.1 X10*3/UL (ref 4.4–11.3)

## 2024-03-29 PROCEDURE — 1170F FXNL STATUS ASSESSED: CPT | Performed by: INTERNAL MEDICINE

## 2024-03-29 PROCEDURE — 80053 COMPREHEN METABOLIC PANEL: CPT

## 2024-03-29 PROCEDURE — 85025 COMPLETE CBC W/AUTO DIFF WBC: CPT

## 2024-03-29 PROCEDURE — 3074F SYST BP LT 130 MM HG: CPT | Performed by: INTERNAL MEDICINE

## 2024-03-29 PROCEDURE — 99214 OFFICE O/P EST MOD 30 MIN: CPT | Performed by: INTERNAL MEDICINE

## 2024-03-29 PROCEDURE — 1160F RVW MEDS BY RX/DR IN RCRD: CPT | Performed by: INTERNAL MEDICINE

## 2024-03-29 PROCEDURE — 3079F DIAST BP 80-89 MM HG: CPT | Performed by: INTERNAL MEDICINE

## 2024-03-29 PROCEDURE — 86803 HEPATITIS C AB TEST: CPT

## 2024-03-29 PROCEDURE — 1159F MED LIST DOCD IN RCRD: CPT | Performed by: INTERNAL MEDICINE

## 2024-03-29 PROCEDURE — 84443 ASSAY THYROID STIM HORMONE: CPT

## 2024-03-29 PROCEDURE — 36415 COLL VENOUS BLD VENIPUNCTURE: CPT

## 2024-03-29 RX ORDER — VENLAFAXINE HYDROCHLORIDE 150 MG/1
150 CAPSULE, EXTENDED RELEASE ORAL DAILY
Qty: 90 CAPSULE | Refills: 3 | Status: SHIPPED | OUTPATIENT
Start: 2024-03-29 | End: 2025-03-29

## 2024-03-29 ASSESSMENT — ACTIVITIES OF DAILY LIVING (ADL)
BATHING: INDEPENDENT
TAKING_MEDICATION: INDEPENDENT
GROCERY_SHOPPING: INDEPENDENT
DOING_HOUSEWORK: INDEPENDENT
DRESSING: INDEPENDENT
MANAGING_FINANCES: INDEPENDENT

## 2024-03-29 ASSESSMENT — PATIENT HEALTH QUESTIONNAIRE - PHQ9
SUM OF ALL RESPONSES TO PHQ9 QUESTIONS 1 AND 2: 0
1. LITTLE INTEREST OR PLEASURE IN DOING THINGS: NOT AT ALL
2. FEELING DOWN, DEPRESSED OR HOPELESS: NOT AT ALL

## 2024-03-29 NOTE — PROGRESS NOTES
Subjective   Patient ID: Tara Clifford is a 73 y.o. female who presents for Follow-up and Fatigue.    C/o Fatigue    [facility]-Moh's     HTN    HPI     Fatigue    Review of Systems      No Fever/chills/headaches/dizziness/chest pains/ cough/ shortness of breath/palpitations/ abdominal pain /Nausea/vomiting/diarrhea/ constipation/urine frequency/blood in urine.      Objective   Pulse 84   Resp 18   Wt 108 kg (237 lb)   SpO2 100%   BMI 37.96 kg/m²     Physical Exam    No JVP elevation. No palpable Lymph Nodes. No Thyromegaly    HEENT- Negative    CVS-NL S1/S2 . No MRG    Lungs-CTA. B/S= B/L    Abdomen-Soft, Non-tender. No masses or HSM    Extremities: No C/C/E    Skin- L nasal scab from recent Mohs    Neuro- Non-Focal        Assessment/Plan     C/o Fatigue    [facility]-Moh's     HTN    ATS-     Stage 2 Obesity    Plan:    Labs    Home sleep study-r/o BOGDAN    Increase physical activity/walking /hiking    Limit salt /sugar intake-     Mammogram    Continue with current Rx    Follow up 3 months/PRN

## 2024-04-04 ENCOUNTER — HOSPITAL ENCOUNTER (OUTPATIENT)
Dept: RADIOLOGY | Facility: CLINIC | Age: 74
Discharge: HOME | End: 2024-04-04
Payer: MEDICARE

## 2024-04-04 VITALS — BODY MASS INDEX: 38.27 KG/M2 | WEIGHT: 238.1 LBS | HEIGHT: 66 IN

## 2024-04-04 DIAGNOSIS — Z12.31 BREAST CANCER SCREENING BY MAMMOGRAM: ICD-10-CM

## 2024-04-04 PROCEDURE — 77063 BREAST TOMOSYNTHESIS BI: CPT | Performed by: STUDENT IN AN ORGANIZED HEALTH CARE EDUCATION/TRAINING PROGRAM

## 2024-04-04 PROCEDURE — 77067 SCR MAMMO BI INCL CAD: CPT

## 2024-04-04 PROCEDURE — 77067 SCR MAMMO BI INCL CAD: CPT | Performed by: STUDENT IN AN ORGANIZED HEALTH CARE EDUCATION/TRAINING PROGRAM

## 2024-04-19 ENCOUNTER — CLINICAL SUPPORT (OUTPATIENT)
Dept: SLEEP MEDICINE | Facility: CLINIC | Age: 74
End: 2024-04-19
Payer: MEDICARE

## 2024-04-19 DIAGNOSIS — R40.0 DAYTIME SOMNOLENCE: ICD-10-CM

## 2024-04-19 DIAGNOSIS — G47.33 OBSTRUCTIVE SLEEP APNEA (ADULT) (PEDIATRIC): ICD-10-CM

## 2024-04-19 PROCEDURE — 95806 SLEEP STUDY UNATT&RESP EFFT: CPT | Performed by: INTERNAL MEDICINE

## 2024-04-19 NOTE — PROGRESS NOTES
Type of Study: HOME SLEEP STUDY - NOMAD     The patient received equipment and instructions for use of the [employer] [facility] Nomad HSAT device. The patient was instructed how to apply the effort belts, cannula, thermistor. It was also explained how the Nomad and oximeter components work.  The patient was asked to record their sleep for an 8-hour period.     The patient was informed of their responsibility for the device and acknowledged this by signing the HSAT device contract. The patient was asked to return the device on 4/22/2024 between the hours of 3889-1319 to the Sleep Center.     The patient was instructed to call 911 as usual for any medical- emergencies while at home.  The patient was also given a phone number for troubleshooting when using the device in case there were additional questions.

## 2024-04-20 PROCEDURE — 95806 SLEEP STUDY UNATT&RESP EFFT: CPT | Performed by: INTERNAL MEDICINE

## 2024-06-10 ASSESSMENT — CUP TO DISC RATIO
OS_RATIO: 0.3
OD_RATIO: 0.3

## 2024-06-10 ASSESSMENT — SLIT LAMP EXAM - LIDS
COMMENTS: GOOD POSITION
COMMENTS: GOOD POSITION

## 2024-06-10 ASSESSMENT — EXTERNAL EXAM - LEFT EYE: OS_EXAM: NORMAL

## 2024-06-10 ASSESSMENT — EXTERNAL EXAM - RIGHT EYE: OD_EXAM: NORMAL

## 2024-06-10 NOTE — PROGRESS NOTES
Dry eyes, [id number]  -Sometimes uses drops in the evening.   -Mild symptoms, improves with artificial tears - may use OU PRN  -History of myokymia of LLL, asymptomatic now - discussed potential etiology and treatment.     Pseudophakia of both eyesZ96.1  -History of high myopia prior to cataract surgery.   -PCIOL with Dr. Pablito Miller around 2/2008, and s/p YAG capsulotomy OU in 2010  -Doing well, good vision. Monitor.   -Continue OTC reading glasses PRN (+2.00 or +2.50). Declines Rx for now.     PVD (posterior vitreous detachment), both eyesH43.813  -Intermittent floaters  -No retinal tear or detachment seen on exam. Retinal detachment symptoms discussed.

## 2024-06-11 ENCOUNTER — OFFICE VISIT (OUTPATIENT)
Dept: OPHTHALMOLOGY | Facility: CLINIC | Age: 74
End: 2024-06-11
Payer: MEDICARE

## 2024-06-11 DIAGNOSIS — H04.123 DRY EYES, BILATERAL: Primary | ICD-10-CM

## 2024-06-11 DIAGNOSIS — Z96.1 PSEUDOPHAKIA: ICD-10-CM

## 2024-06-11 DIAGNOSIS — H43.813 PVD (POSTERIOR VITREOUS DETACHMENT), BOTH EYES: ICD-10-CM

## 2024-06-11 PROCEDURE — 92014 COMPRE OPH EXAM EST PT 1/>: CPT | Performed by: OPHTHALMOLOGY

## 2024-06-11 ASSESSMENT — TONOMETRY
OS_IOP_MMHG: 14
IOP_METHOD: GOLDMANN APPLANATION
OD_IOP_MMHG: 14

## 2024-06-11 ASSESSMENT — CONF VISUAL FIELD
OS_INFERIOR_TEMPORAL_RESTRICTION: 0
OS_INFERIOR_NASAL_RESTRICTION: 0
OS_SUPERIOR_TEMPORAL_RESTRICTION: 0
OD_SUPERIOR_NASAL_RESTRICTION: 0
OS_SUPERIOR_NASAL_RESTRICTION: 0
OD_SUPERIOR_TEMPORAL_RESTRICTION: 0
OS_NORMAL: 1
OD_NORMAL: 1
OD_INFERIOR_NASAL_RESTRICTION: 0
OD_INFERIOR_TEMPORAL_RESTRICTION: 0

## 2024-06-11 ASSESSMENT — REFRACTION_MANIFEST
OS_SPHERE: +0.50
OD_SPHERE: +0.25
OS_CYLINDER: -0.50
OS_ADD: +2.50
OD_CYLINDER: SPHERE
OD_ADD: +2.50
OS_AXIS: 090

## 2024-06-11 ASSESSMENT — ENCOUNTER SYMPTOMS
ALLERGIC/IMMUNOLOGIC NEGATIVE: 0
ENDOCRINE NEGATIVE: 0
HEMATOLOGIC/LYMPHATIC NEGATIVE: 0
MUSCULOSKELETAL NEGATIVE: 0
CONSTITUTIONAL NEGATIVE: 0
NEUROLOGICAL NEGATIVE: 0
PSYCHIATRIC NEGATIVE: 0
EYES NEGATIVE: 1
CARDIOVASCULAR NEGATIVE: 0
RESPIRATORY NEGATIVE: 0
GASTROINTESTINAL NEGATIVE: 0

## 2024-06-11 ASSESSMENT — VISUAL ACUITY
OS_SC: 20/30
METHOD: SNELLEN - LINEAR
OD_SC: 20/25

## 2024-06-11 ASSESSMENT — REFRACTION_WEARINGRX
OS_SPHERE: OTC NVO
OD_SPHERE: OTC NVO

## 2024-07-01 ENCOUNTER — APPOINTMENT (OUTPATIENT)
Dept: PRIMARY CARE | Facility: CLINIC | Age: 74
End: 2024-07-01
Payer: MEDICARE

## 2024-07-01 VITALS
BODY MASS INDEX: 38.66 KG/M2 | OXYGEN SATURATION: 91 % | WEIGHT: 241.4 LBS | HEART RATE: 68 BPM | RESPIRATION RATE: 16 BRPM

## 2024-07-01 DIAGNOSIS — E66.01 OBESITY, MORBID (MULTI): ICD-10-CM

## 2024-07-01 DIAGNOSIS — Z00.00 ROUTINE GENERAL MEDICAL EXAMINATION AT A HEALTH CARE FACILITY: Primary | ICD-10-CM

## 2024-07-01 DIAGNOSIS — R26.81 GAIT INSTABILITY: ICD-10-CM

## 2024-07-01 PROBLEM — M72.2 PLANTAR FASCIA SYNDROME: Status: ACTIVE | Noted: 2024-07-01

## 2024-07-01 PROBLEM — D18.01 HEMANGIOMA OF SKIN: Status: ACTIVE | Noted: 2024-07-01

## 2024-07-01 PROBLEM — N95.2 ATROPHIC VAGINITIS: Status: ACTIVE | Noted: 2024-07-01

## 2024-07-01 PROBLEM — Z90.721 S/P HYSTERECTOMY WITH OOPHORECTOMY: Status: ACTIVE | Noted: 2024-07-01

## 2024-07-01 PROBLEM — M17.9 DJD (DEGENERATIVE JOINT DISEASE) OF KNEE: Status: ACTIVE | Noted: 2024-07-01

## 2024-07-01 PROBLEM — Z90.710 S/P HYSTERECTOMY WITH OOPHORECTOMY: Status: ACTIVE | Noted: 2024-07-01

## 2024-07-01 PROBLEM — D48.5 NEOPLASM OF UNCERTAIN BEHAVIOR OF SKIN: Status: ACTIVE | Noted: 2024-07-01

## 2024-07-01 PROBLEM — D22.9 MULTIPLE BENIGN MELANOCYTIC NEVI: Status: ACTIVE | Noted: 2024-07-01

## 2024-07-01 PROBLEM — M85.80 OSTEOPENIA: Status: ACTIVE | Noted: 2024-07-01

## 2024-07-01 PROBLEM — M17.9 OSTEOARTHRITIS OF KNEE: Status: ACTIVE | Noted: 2024-07-01

## 2024-07-01 PROBLEM — R20.2 NOTALGIA PARESTHETICA: Status: ACTIVE | Noted: 2024-07-01

## 2024-07-01 PROBLEM — L81.4 LENTIGINOSIS: Status: ACTIVE | Noted: 2024-07-01

## 2024-07-01 PROBLEM — M81.0 POSTMENOPAUSAL BONE LOSS: Status: ACTIVE | Noted: 2024-07-01

## 2024-07-01 PROBLEM — N36.8 PROLAPSE OF URETHRA: Status: ACTIVE | Noted: 2024-07-01

## 2024-07-01 PROBLEM — L82.1 SEBORRHEIC KERATOSIS: Status: ACTIVE | Noted: 2024-07-01

## 2024-07-01 PROBLEM — C44.311 BASAL CELL CARCINOMA (BCC) OF SKIN OF NOSE: Status: ACTIVE | Noted: 2024-07-01

## 2024-07-01 PROBLEM — M72.2 PLANTAR FASCIITIS: Status: ACTIVE | Noted: 2024-07-01

## 2024-07-01 PROBLEM — M47.816 DJD (DEGENERATIVE JOINT DISEASE), LUMBAR: Status: ACTIVE | Noted: 2024-07-01

## 2024-07-01 PROBLEM — R93.1 AGATSTON CORONARY ARTERY CALCIUM SCORE LESS THAN 100: Status: ACTIVE | Noted: 2018-12-13

## 2024-07-01 PROBLEM — M19.079 ARTHRITIS OF FOOT: Status: ACTIVE | Noted: 2024-07-01

## 2024-07-01 PROBLEM — M81.0 POST-MENOPAUSAL OSTEOPOROSIS: Status: ACTIVE | Noted: 2024-07-01

## 2024-07-01 PROCEDURE — 1158F ADVNC CARE PLAN TLK DOCD: CPT | Performed by: INTERNAL MEDICINE

## 2024-07-01 PROCEDURE — 99213 OFFICE O/P EST LOW 20 MIN: CPT | Performed by: INTERNAL MEDICINE

## 2024-07-01 PROCEDURE — 1123F ACP DISCUSS/DSCN MKR DOCD: CPT | Performed by: INTERNAL MEDICINE

## 2024-07-01 PROCEDURE — 1159F MED LIST DOCD IN RCRD: CPT | Performed by: INTERNAL MEDICINE

## 2024-07-01 ASSESSMENT — ENCOUNTER SYMPTOMS
DEPRESSION: 1
OCCASIONAL FEELINGS OF UNSTEADINESS: 0
LOSS OF SENSATION IN FEET: 0

## 2024-07-01 ASSESSMENT — PATIENT HEALTH QUESTIONNAIRE - PHQ9
SUM OF ALL RESPONSES TO PHQ9 QUESTIONS 1 AND 2: 3
1. LITTLE INTEREST OR PLEASURE IN DOING THINGS: NOT AT ALL
9. THOUGHTS THAT YOU WOULD BE BETTER OFF DEAD, OR OF HURTING YOURSELF: NOT AT ALL
2. FEELING DOWN, DEPRESSED OR HOPELESS: NOT AT ALL
1. LITTLE INTEREST OR PLEASURE IN DOING THINGS: NEARLY EVERY DAY
8. MOVING OR SPEAKING SO SLOWLY THAT OTHER PEOPLE COULD HAVE NOTICED. OR THE OPPOSITE, BEING SO FIGETY OR RESTLESS THAT YOU HAVE BEEN MOVING AROUND A LOT MORE THAN USUAL: NOT AT ALL
7. TROUBLE CONCENTRATING ON THINGS, SUCH AS READING THE NEWSPAPER OR WATCHING TELEVISION: NEARLY EVERY DAY
6. FEELING BAD ABOUT YOURSELF - OR THAT YOU ARE A FAILURE OR HAVE LET YOURSELF OR YOUR FAMILY DOWN: NOT AT ALL
SUM OF ALL RESPONSES TO PHQ QUESTIONS 1-9: 7
SUM OF ALL RESPONSES TO PHQ9 QUESTIONS 1 AND 2: 0
3. TROUBLE FALLING OR STAYING ASLEEP OR SLEEPING TOO MUCH: NOT AT ALL
5. POOR APPETITE OR OVEREATING: NOT AT ALL
4. FEELING TIRED OR HAVING LITTLE ENERGY: SEVERAL DAYS
2. FEELING DOWN, DEPRESSED OR HOPELESS: NOT AT ALL
10. IF YOU CHECKED OFF ANY PROBLEMS, HOW DIFFICULT HAVE THESE PROBLEMS MADE IT FOR YOU TO DO YOUR WORK, TAKE CARE OF THINGS AT HOME, OR GET ALONG WITH OTHER PEOPLE: SOMEWHAT DIFFICULT

## 2024-07-01 NOTE — PATIENT INSTRUCTIONS

## 2024-07-01 NOTE — PROGRESS NOTES
Subjective   Patient ID: Tara Clifford is a 74 y.o. female who presents for Blood Pressure Check, Extremity Weakness (legs), and Fall.    HPI     Review of Systems    Fall- 2 months ago    No Fever/chills/headaches/dizziness/chest pains/ cough/ shortness of breath/palpitations/ abdominal pain /Nausea/vomiting/diarrhea/ constipation/urine frequency/blood in urine.      Objective   Pulse 68   Resp 16   Wt 109 kg (241 lb 6.4 oz)   SpO2 91%   BMI 38.66 kg/m²     Physical Exam    No JVP elevation. No palpable Lymph Nodes. No Thyromegaly    HEENT- Negative    CVS-NL S1/S2 . No MRG    Lungs-CTA. B/S= B/L    Abdomen-Soft, Non-tender. No masses or HSM    Extremities: No C/C/    1+ ankle swelling    Skin-No abnormal moles/rash        Assessment/Plan        Patient was identified as a fall risk. Risk prevention instructions provided. Refer to PT-Patient was identified as a fall risk. Risk prevention instructions provided.    HTN-goal < 130/80-   - Encouraged sodium restriction, DASH or Mediterranean diet. Continue with Amlodipine 5 mg daily.     Goals    None      Elevated BMI-   Tighten diet- Increase protein/fish/fiber intake and limit processed/refined carbohydrates and added sugars. Increased physical activity to a minimum of 150 minutes of moderate exercise per week.     Gait instability/Fall- refer to PT for further EM

## 2024-07-09 ENCOUNTER — APPOINTMENT (OUTPATIENT)
Dept: DERMATOLOGY | Facility: CLINIC | Age: 74
End: 2024-07-09
Payer: MEDICARE

## 2024-07-09 DIAGNOSIS — L82.0 INFLAMED SEBORRHEIC KERATOSIS: ICD-10-CM

## 2024-07-09 DIAGNOSIS — Z12.83 SCREENING EXAM FOR SKIN CANCER: ICD-10-CM

## 2024-07-09 DIAGNOSIS — D22.9 MULTIPLE BENIGN NEVI: Primary | ICD-10-CM

## 2024-07-09 DIAGNOSIS — L82.1 SEBORRHEIC KERATOSIS: ICD-10-CM

## 2024-07-09 DIAGNOSIS — L81.4 LENTIGO: ICD-10-CM

## 2024-07-09 DIAGNOSIS — Z85.828 PERSONAL HISTORY OF SKIN CANCER: ICD-10-CM

## 2024-07-09 DIAGNOSIS — L57.0 ACTINIC KERATOSIS: ICD-10-CM

## 2024-07-09 PROBLEM — D18.01 HEMANGIOMA OF SKIN: Status: RESOLVED | Noted: 2024-07-01 | Resolved: 2024-07-09

## 2024-07-09 PROBLEM — D48.5 NEOPLASM OF UNCERTAIN BEHAVIOR OF SKIN: Status: RESOLVED | Noted: 2024-07-01 | Resolved: 2024-07-09

## 2024-07-09 PROCEDURE — 17110 DESTRUCTION B9 LES UP TO 14: CPT | Performed by: DERMATOLOGY

## 2024-07-09 PROCEDURE — 1159F MED LIST DOCD IN RCRD: CPT | Performed by: DERMATOLOGY

## 2024-07-09 PROCEDURE — 1160F RVW MEDS BY RX/DR IN RCRD: CPT | Performed by: DERMATOLOGY

## 2024-07-09 PROCEDURE — 99213 OFFICE O/P EST LOW 20 MIN: CPT | Performed by: DERMATOLOGY

## 2024-07-09 PROCEDURE — 1036F TOBACCO NON-USER: CPT | Performed by: DERMATOLOGY

## 2024-07-09 PROCEDURE — 17000 DESTRUCT PREMALG LESION: CPT | Performed by: DERMATOLOGY

## 2024-07-09 ASSESSMENT — DERMATOLOGY QUALITY OF LIFE (QOL) ASSESSMENT
RATE HOW BOTHERED YOU ARE BY EFFECTS OF YOUR SKIN PROBLEMS ON YOUR ACTIVITIES (EG, GOING OUT, ACCOMPLISHING WHAT YOU WANT, WORK ACTIVITIES OR YOUR RELATIONSHIPS WITH OTHERS): 0 - NEVER BOTHERED
RATE HOW BOTHERED YOU ARE BY SYMPTOMS OF YOUR SKIN PROBLEM (EG, ITCHING, STINGING BURNING, HURTING OR SKIN IRRITATION): 0 - NEVER BOTHERED
ARE THERE EXCLUSIONS OR EXCEPTIONS FOR THE QUALITY OF LIFE ASSESSMENT: NO
DATE THE QUALITY-OF-LIFE ASSESSMENT WAS COMPLETED: 67030
RATE HOW EMOTIONALLY BOTHERED YOU ARE BY YOUR SKIN PROBLEM (FOR EXAMPLE, WORRY, EMBARRASSMENT, FRUSTRATION): 0 - NEVER BOTHERED

## 2024-07-09 ASSESSMENT — DERMATOLOGY PATIENT ASSESSMENT
ARE YOU AN ORGAN TRANSPLANT RECIPIENT: NO
DO YOU HAVE IRREGULAR MENSTRUAL CYCLES: NO
DO YOU USE SUNSCREEN: OCCASIONALLY
ARE YOU TRYING TO GET PREGNANT: NO
DO YOU HAVE ANY NEW OR CHANGING LESIONS: NO
DO YOU USE A TANNING BED: NO
ARE YOU ON BIRTH CONTROL: NO
HAVE YOU HAD OR DO YOU HAVE A STAPH INFECTION: NO
HAVE YOU HAD OR DO YOU HAVE VASCULAR DISEASE: NO

## 2024-07-09 ASSESSMENT — PATIENT GLOBAL ASSESSMENT (PGA): PATIENT GLOBAL ASSESSMENT: PATIENT GLOBAL ASSESSMENT:  1 - CLEAR

## 2024-07-09 ASSESSMENT — ITCH NUMERIC RATING SCALE: HOW SEVERE IS YOUR ITCHING?: 0

## 2024-07-09 NOTE — PROGRESS NOTES
Subjective     Tara Clifford is a 74 y.o. female who presents for the following: Skin Check (chest).     Last Full Skin Exam  1/4/24 - basal cell carcinoma diagnosed on left alar crease and subsequently treated with Mohs surgery 3/22/24 with Dr. Briones. Another angioma was noted on her nasal rim but had benign pattern, agreed to monitor.  Healing well, very happy.     She recently started an outdoor water class    Review of Systems:  No other skin or systemic complaints other than what is documented elsewhere in the note.    The following portions of the chart were reviewed this encounter and updated as appropriate:  Tobacco  Allergies  Meds  Problems  Med Hx  Surg Hx         Skin Cancer History  Biopsy Date Type Location Status   1/4/24 BCC Left Alar Crease Patient/Caregiver Informed       Specialty Problems    None         Objective   Well appearing patient in no apparent distress; mood and affect are within normal limits.    A full examination was performed including scalp, head, eyes, ears, nose, lips, neck, chest, axillae, abdomen, back, buttocks, bilateral upper extremities, bilateral lower extremities, hands, feet, fingers, toes, fingernails, and toenails. All findings within normal limits unless otherwise noted below.    Assessment/Plan   1. Multiple benign nevi  Brown and tan macules and papules with reassuring findings on dermoscopy    -These lesions have benign, reassuring patterns on dermoscopy  -Recommend continued self observation, and to contact the office if any changes in nevi are noticed    2. Screening exam for skin cancer  As part of a routine Full Skin Exam, a genital examination with the presence of a chaperone was offered. The patient agreed to the exam and declined the chaperone.       Full body skin exam  -No lesions concerning for malignancy on the remainder the skin exam today   - The ugly duckling sign was discussed. Monitor for any skin lesions that are different in color, shape, or  size than others on body  -Sun protection was discussed. Recommend SPF 30+, hats with brims, sun protective clothing, and avoiding sun exposure between 10 AM and 2 PM whenever possible  -Recommend regular skin exams or sooner if new or changing lesions       Related Procedures  Follow Up In Dermatology - Established Patient  Follow Up In Dermatology - Established Patient    3. Lentigo  Tan macules    -Benign appearing on exam  -Reassurance, recommend observation    4. Seborrheic keratosis  Stuck on, waxy macule(s)/papule(s)/plaque(s) with comedo-like openings and milia like cysts    -Discussed the nature of the diagnosis  -Reassurance, recommend continued observation    5. Personal history of skin cancer    Personal History of Non-Melanoma Skin Cancer  -Well healed scar(s) with no evidence of recurrence  -Discussed the need for annual or semi-annual skin examinations and to return sooner if any new or changing lesions are noticed. Patient verbalizes understanding    6. Actinic keratosis  Right Buccal Cheek  Erythematous scaly macule(s)    -Discussed nature of diagnosis and treatment options.   -Patient wishes to proceed with Cryotherapy today  -Possible side effects of liquid nitrogen treatment reviewed including formation of blisters, crusting, tenderness, scar, and discoloration which may be permanent.  -Patient advised to return the office for re-evaluation if the treated lesion(s) do not resolve within 4-6 weeks. Patient verbalizes understanding.    Destr of lesion - Right Buccal Cheek  Complexity: simple    Destruction method: cryotherapy    Informed consent: discussed and consent obtained    Lesion destroyed using liquid nitrogen: Yes    Outcome: patient tolerated procedure well with no complications    Post-procedure details: wound care instructions given      7. Inflamed seborrheic keratosis (5)  Chest - Medial (Center), Left Shoulder - Anterior, Right Breast (3)  Stuck-on, waxy macule(s)/papule(s)/plaque(s)  with comedo-like openings and milia-like cysts with surrounding erythema and crusting    -Patient requests cryotherapy today for these clinically inflamed lesions  -Possible side effects of liquid nitrogen treatment reviewed including formation of blisters, crusting, tenderness, scar, and discoloration which may be permanent.    Destr of lesion - Chest - Medial (Center), Left Shoulder - Anterior, Right Breast  Complexity: simple    Destruction method: cryotherapy    Informed consent: discussed and consent obtained    Lesion destroyed using liquid nitrogen: Yes    Outcome: patient tolerated procedure well with no complications          Follow up 6-12 months Full Skin Exam

## 2024-08-12 ENCOUNTER — EVALUATION (OUTPATIENT)
Dept: PHYSICAL THERAPY | Facility: CLINIC | Age: 74
End: 2024-08-12
Payer: MEDICARE

## 2024-08-12 DIAGNOSIS — M54.9 CHRONIC BACK PAIN: ICD-10-CM

## 2024-08-12 DIAGNOSIS — R53.82 CHRONIC FATIGUE: ICD-10-CM

## 2024-08-12 DIAGNOSIS — Z91.81 AT RISK FOR INJURY RELATED TO FALL: Primary | ICD-10-CM

## 2024-08-12 DIAGNOSIS — R26.81 GAIT INSTABILITY: ICD-10-CM

## 2024-08-12 DIAGNOSIS — G89.29 CHRONIC BACK PAIN: ICD-10-CM

## 2024-08-12 PROCEDURE — 97535 SELF CARE MNGMENT TRAINING: CPT | Mod: GP | Performed by: PHYSICAL THERAPIST

## 2024-08-12 PROCEDURE — 97162 PT EVAL MOD COMPLEX 30 MIN: CPT | Mod: GP | Performed by: PHYSICAL THERAPIST

## 2024-08-12 PROCEDURE — 97110 THERAPEUTIC EXERCISES: CPT | Mod: GP | Performed by: PHYSICAL THERAPIST

## 2024-08-12 ASSESSMENT — ENCOUNTER SYMPTOMS
DEPRESSION: 0
LOSS OF SENSATION IN FEET: 0
OCCASIONAL FEELINGS OF UNSTEADINESS: 1

## 2024-08-12 NOTE — PROGRESS NOTES
Physical Therapy    Physical Therapy Evaluation and Treatment      Patient Name: Tara Clifford  MRN: [id number]  Today's Date: 8/12/2024    Time Entry:   Time Calculation  Start Time: 1000  Stop Time: 1100  Time Calculation (min): 60 min  PT Evaluation Time Entry  PT Evaluation (Moderate) Time Entry: 35  PT Therapeutic Procedures Time Entry  Therapeutic Exercise Time Entry: 10  Self-Care/Home Mgmt Training: 15                   Assessment:    Patient presents with clinical signs and symptoms causing increased fall risk due to R hip joint mobility dysfunction, muscle weakness , pain , antalgic gait .  These impairments affect ADLs, work, recreational activity, exercise, transfer ability, ambulation, and sleep function that requires skilled PT intervention to resolve and enable patient to return to previous level of function. Factors that may affect progress in PT are chronic pain, obesity, medical co-morbidities, , and patient compliance.  Patient response to initial treatment of  self management education and initial exercise instruction was understood and performed well       Plan:   Neuro re-ed,there ex, therapeutic activity, self management, manual therapy, gait training education modality /heat        Problem List Items Addressed This Visit             ICD-10-CM    Fatigue R53.83    Relevant Orders    Follow Up In Physical Therapy    Chronic back pain M54.9, G89.29    At risk for injury related to fall - Primary Z91.81    Relevant Orders    Follow Up In Physical Therapy     Other Visit Diagnoses         Codes    Gait instability     R26.81    Relevant Orders    Follow Up In Physical Therapy             Subjective    .pt reports balance issues. She has long h/o LBP . She had a fall in may 24 stepping back and she was unable to get up off floor on own. She has knee pain and stiffness, H/o R ankle Fx 2016, She exercises in pool. She does chiropractic treatments  monthly     Pain:   R groin and posterior R hip pain  4-5/10  Home Living:   Lives in 1 story home alone  Prior Level of Function:   Tara Clifford is retired  , She has help with house cleaning. She has hand significant difficulty getting up out of pool and up/down stepos for 3-4 years    Objective   Cognition:   A+Ox3  GAIT  Antalgic R LE fwd stooped, knees stay partially flexed  Posture;  L pelvis high by 1 thumb breadth  Strength:  Hip flexor r 3+/5 P L 4/5, Knee extension  R 3+/5 P L 4/5, ankle DF R    L  Hip abduction R 3-/5, L 3+/5  AROM   knee R-12/113 deg  P,  L -8 to 115 deg  PROM  : hip  flex  R 95 P, L 100 deg      IR  R 20 P  L  25 hip extension  R to neutral O deg P,  L 10 deg  Pelvic bridge: barely clears table flat hand and hamstring cramps  Prone side glide R to L stiff       Outcome Measures:  LEFS: 13/80    Treatments:  There ex:  Alternate foot press 2x 10 r and L    EDUCATION:   extensive education regarding mechanism of injury, relevant functional anatomy, treatment program rational, self management, HEP, and POC   Including discussion regarding rationale for R hip joint mobilization  to improve lumbopelvic/hip alignment    Goals:  1. Increase   R hip joint PROM to   10  deg extension, 25 deg IR, and less painful pinch into flexion to 100 deg  2. Increase R hip abduction strength to 3+/5 strength   3. Eliminate compensatory gait pattern to normalize gait   4. Improve outcome score by 5 points  5. Improve sit to stand to 3 reps from standard chair without pushoff  6. Independent Home exercise program

## 2024-08-16 ENCOUNTER — APPOINTMENT (OUTPATIENT)
Dept: PHYSICAL THERAPY | Facility: CLINIC | Age: 74
End: 2024-08-16
Payer: MEDICARE

## 2024-08-21 ENCOUNTER — TREATMENT (OUTPATIENT)
Dept: PHYSICAL THERAPY | Facility: CLINIC | Age: 74
End: 2024-08-21
Payer: MEDICARE

## 2024-08-21 DIAGNOSIS — M54.9 CHRONIC BACK PAIN: ICD-10-CM

## 2024-08-21 DIAGNOSIS — G89.29 CHRONIC BACK PAIN: ICD-10-CM

## 2024-08-21 DIAGNOSIS — R26.81 GAIT INSTABILITY: Primary | ICD-10-CM

## 2024-08-21 DIAGNOSIS — Z91.81 AT RISK FOR INJURY RELATED TO FALL: ICD-10-CM

## 2024-08-21 DIAGNOSIS — R53.82 CHRONIC FATIGUE: ICD-10-CM

## 2024-08-21 PROCEDURE — 97116 GAIT TRAINING THERAPY: CPT | Mod: GP | Performed by: PHYSICAL THERAPIST

## 2024-08-21 PROCEDURE — 97110 THERAPEUTIC EXERCISES: CPT | Mod: GP | Performed by: PHYSICAL THERAPIST

## 2024-08-21 NOTE — PROGRESS NOTES
Physical Therapy    Physical Therapy Evaluation and Treatment      Patient Name: Tara Clifford  MRN: [id number]  Today's Date: 8/21/24  Visit 2  Time Entry:   Time Calculation  Start Time: 1300  Stop Time: 1340  Time Calculation (min): 40 min     PT Therapeutic Procedures Time Entry  Therapeutic Exercise Time Entry: 25  Gait Training Time Entry: 15                   Assessment:  Tara Clifford needs much verbal and demonstration cuing to perform exercises and gait pattern appropriately      Plan:   .Continue core and LE strengthening progression to improve sit to stand transfer and gait function      Problem List Items Addressed This Visit             ICD-10-CM    Fatigue R53.83    Chronic back pain M54.9, G89.29    At risk for injury related to fall Z91.81    Gait instability - Primary R26.81        Subjective    Tara Clifford brought cane with her to learn proper gait pattern     Pain:   R groin and posterior R hip pain 4/10      Objective   Treatments:  There ex:  Sci fit recumbent stepper 6 min at 40 RPM lv 2.2   Alternate foot press 2x 10 r and L  Isometric hip abduction and adduction 5 x each hold 10 sec  Standing alternate wall kick 2x 10  Gait  Instruction with std std cane 3 point step through pattern on level surface  EDUCATION:  Access Code: [email or web address]  URL: [email or web address]  Date: 08/21/2024  Prepared by: Selvin Kong    Exercises  - Supine Hip Adduction Isometric with Ball  - 1 x daily - 7 x weekly - 2 sets - 5 reps - 10 hold  - Hooklying Isometric Hip Abduction with Belt  - 1 x daily - 7 x weekly - 2 sets - 5 reps - 10 hold  - Dead Bug  - 1 x daily - 7 x weekly - 2 sets - 5 reps - 10 hold    Goals:  1. Increase   R hip joint PROM to   10  deg extension, 25 deg IR, and less painful pinch into flexion to 100 deg  2. Increase R hip abduction strength to 3+/5 strength   3. Eliminate compensatory gait pattern to normalize gait   4. Improve outcome score by 5 points  5. Improve sit to  stand to 3 reps from standard chair without pushoff  6. Independent Home exercise program

## 2024-08-28 ENCOUNTER — TREATMENT (OUTPATIENT)
Dept: PHYSICAL THERAPY | Facility: CLINIC | Age: 74
End: 2024-08-28
Payer: MEDICARE

## 2024-08-28 DIAGNOSIS — Z91.81 AT RISK FOR INJURY RELATED TO FALL: ICD-10-CM

## 2024-08-28 DIAGNOSIS — G89.29 CHRONIC BACK PAIN: ICD-10-CM

## 2024-08-28 DIAGNOSIS — M54.9 CHRONIC BACK PAIN: ICD-10-CM

## 2024-08-28 DIAGNOSIS — R53.82 CHRONIC FATIGUE: ICD-10-CM

## 2024-08-28 DIAGNOSIS — R26.81 GAIT INSTABILITY: Primary | ICD-10-CM

## 2024-08-28 PROCEDURE — 97110 THERAPEUTIC EXERCISES: CPT | Mod: GP | Performed by: PHYSICAL THERAPIST

## 2024-08-28 NOTE — PROGRESS NOTES
"Physical Therapy    Physical Therapy Evaluation and Treatment      Patient Name: Tara Clifford  MRN: [id number]  Today's Date: 8/28/24  Visit 3  Time Entry:   Time Calculation  Start Time: 1530  Stop Time: 1610  Time Calculation (min): 40 min     PT Therapeutic Procedures Time Entry  Therapeutic Exercise Time Entry: 40                   Assessment:  Tara Clifford tolerated increased exercise intensity and duration without extra hip or knee pain. She needed less cuing to perform exercise appropriately      Plan:   .Continue core and LE strengthening progression to improve sit to stand transfer and gait function      Problem List Items Addressed This Visit             ICD-10-CM    Fatigue R53.83    Chronic back pain M54.9, G89.29    At risk for injury related to fall Z91.81    Gait instability - Primary R26.81        Subjective    Tara Clifford note that today is better than usual. She did a silver slipper exercise program today     Pain:   R groin and posterior R hip pain 4/10      Objective   Treatments:  There ex:  Sci fit recumbent stepper 8 min at 50 RPM lv 2.2   Sit to stand with arm pull at rail 3 x 5 \" get tall\"  Kickstand stance 3 x 10 sec each 6\" step  Standing alternate foot tap 2x 10 6\"  LAQ 5 lbs each 3x 10   Seated resisted hip abduction 3 x 12 blue  6\" yoga block step over 2x 5 fwd and lateral  EDUCATION:  Access Code: [email or web address]  URL: [email or web address]  Date: 08/28/2024  Prepared by: Selvin Kong    Exercises  - Seated Hip Abduction with Resistance  - 1 x daily - 7 x weekly - 3 sets - 12 reps - 3 hold    Goals:  1. Increase   R hip joint PROM to   10  deg extension, 25 deg IR, and less painful pinch into flexion to 100 deg  2. Increase R hip abduction strength to 3+/5 strength   3. Eliminate compensatory gait pattern to normalize gait   4. Improve outcome score by 5 points  5. Improve sit to stand to 3 reps from standard chair without pushoff  6. Independent Home exercise program "

## 2024-09-05 DIAGNOSIS — M25.551 ARTHRALGIA OF HIP, RIGHT: ICD-10-CM

## 2024-09-05 DIAGNOSIS — M25.561 ARTHRALGIA OF KNEE, RIGHT: Primary | ICD-10-CM

## 2024-09-10 ENCOUNTER — HOSPITAL ENCOUNTER (OUTPATIENT)
Dept: RADIOLOGY | Facility: CLINIC | Age: 74
Discharge: HOME | End: 2024-09-10
Payer: MEDICARE

## 2024-09-10 DIAGNOSIS — M25.561 ARTHRALGIA OF KNEE, RIGHT: ICD-10-CM

## 2024-09-10 DIAGNOSIS — M25.551 ARTHRALGIA OF HIP, RIGHT: ICD-10-CM

## 2024-09-10 PROCEDURE — 73502 X-RAY EXAM HIP UNI 2-3 VIEWS: CPT | Mod: RIGHT SIDE | Performed by: STUDENT IN AN ORGANIZED HEALTH CARE EDUCATION/TRAINING PROGRAM

## 2024-09-10 PROCEDURE — 73560 X-RAY EXAM OF KNEE 1 OR 2: CPT | Mod: RIGHT SIDE | Performed by: STUDENT IN AN ORGANIZED HEALTH CARE EDUCATION/TRAINING PROGRAM

## 2024-09-10 PROCEDURE — 73502 X-RAY EXAM HIP UNI 2-3 VIEWS: CPT | Mod: RT

## 2024-09-10 PROCEDURE — 73560 X-RAY EXAM OF KNEE 1 OR 2: CPT | Mod: RT

## 2024-09-12 DIAGNOSIS — M11.861 CALCIUM PYROPHOSPHATE DEPOSITION DISEASE (CPDD) OF RIGHT KNEE: Primary | ICD-10-CM

## 2024-09-16 NOTE — PROGRESS NOTES
"Physical Therapy    Physical Therapy Evaluation and Treatment      Patient Name: Tara Clifford  MRN: [id number]  Today's Date: 9/17/24  Visit 4  Time Entry:         PT Therapeutic Procedures Time Entry  Therapeutic Exercise Time Entry: 40                   Assessment:  Tara Clifford tolerated increased exercise intensity and duration without extra hip or knee pain. She needed less cuing to perform exercise appropriately. She going sit to stand more easily        Plan:   .Continue core and LE strengthening progression to improve sit to stand transfer and gait function      Problem List Items Addressed This Visit             ICD-10-CM    Fatigue R53.83    Chronic back pain M54.9, G89.29    At risk for injury related to fall Z91.81    Gait instability - Primary R26.81        Subjective    Tara Clifford notes more energy and usually less hip and knee pain     Pain:   R groin and posterior R hip pain 2/10 , R knee pain 3/10      Objective   Treatments:  There ex:  Sci fit recumbent stepper 8 min at 50 RPM lv 2.2   Isometric partial squat hold at rail 5 x 10 sec  Sit to stand with arm pull at rail 3 x 5 \" get tall\"  Kickstand stance 3 x 10 sec each 6\" step  Standing alternate foot tap 2x 10 6\"  LAQ 5 lbs each 3x 10   Seated resisted hip abduction 3 x 12 blue  6\" yoga block step over 2x 5 fwd and lateral  EDUCATION:  HEP Isometric partial squat position holds for positional strength and pain relief    Goals:  1. Increase   R hip joint PROM to   10  deg extension, 25 deg IR, and less painful pinch into flexion to 100 deg  2. Increase R hip abduction strength to 3+/5 strength   3. Eliminate compensatory gait pattern to normalize gait   4. Improve outcome score by 5 points  5. Improve sit to stand to 3 reps from standard chair without pushoff  6. Independent Home exercise program             "

## 2024-09-17 ENCOUNTER — TREATMENT (OUTPATIENT)
Dept: PHYSICAL THERAPY | Facility: CLINIC | Age: 74
End: 2024-09-17
Payer: MEDICARE

## 2024-09-17 DIAGNOSIS — R53.82 CHRONIC FATIGUE: ICD-10-CM

## 2024-09-17 DIAGNOSIS — M54.9 CHRONIC BACK PAIN: ICD-10-CM

## 2024-09-17 DIAGNOSIS — G89.29 CHRONIC BACK PAIN: ICD-10-CM

## 2024-09-17 DIAGNOSIS — R26.81 GAIT INSTABILITY: Primary | ICD-10-CM

## 2024-09-17 DIAGNOSIS — Z91.81 AT RISK FOR INJURY RELATED TO FALL: ICD-10-CM

## 2024-09-17 PROCEDURE — 97110 THERAPEUTIC EXERCISES: CPT | Mod: GP | Performed by: PHYSICAL THERAPIST

## 2024-09-24 ENCOUNTER — TREATMENT (OUTPATIENT)
Dept: PHYSICAL THERAPY | Facility: CLINIC | Age: 74
End: 2024-09-24
Payer: MEDICARE

## 2024-09-24 DIAGNOSIS — Z91.81 AT RISK FOR INJURY RELATED TO FALL: ICD-10-CM

## 2024-09-24 DIAGNOSIS — R53.82 CHRONIC FATIGUE: ICD-10-CM

## 2024-09-24 DIAGNOSIS — R26.81 GAIT INSTABILITY: Primary | ICD-10-CM

## 2024-09-24 DIAGNOSIS — M54.9 CHRONIC BACK PAIN: ICD-10-CM

## 2024-09-24 DIAGNOSIS — G89.29 CHRONIC BACK PAIN: ICD-10-CM

## 2024-09-24 PROCEDURE — 97110 THERAPEUTIC EXERCISES: CPT | Mod: GP | Performed by: PHYSICAL THERAPIST

## 2024-09-24 NOTE — PROGRESS NOTES
"Physical Therapy    Physical Therapy Evaluation and Treatment      Patient Name: Tara Clifford  MRN: [id number]  Today's Date: 9/24/24  Visit 5  Time Entry:   Time Calculation  Start Time: 1100  Stop Time: 1145  Time Calculation (min): 45 min     PT Therapeutic Procedures Time Entry  Therapeutic Exercise Time Entry: 40                   Assessment:  Tara Clifford needs much exercise modification to minimize pain and perform correctly. She is now aware of exercise position and force transfer correction that can significantly reduce pain during exercise.        Plan:   .Continue core and LE strengthening progression to improve sit to stand transfer and gait function      Problem List Items Addressed This Visit             ICD-10-CM    Fatigue R53.83    Chronic back pain M54.9, G89.29    At risk for injury related to fall Z91.81    Gait instability - Primary R26.81        Subjective    Tara Clifford continues to perform HEP. Sit to stand difficult     Pain:   R groin and posterior R hip pain 2/10 , R knee pain 3/10      Objective   Treatments:  There ex:  Sci fit recumbent stepper 9 min at 50 RPM lv 2.2   Dead bug isometric hold 10 sec alternate leg x 5  Isometric partial squat hold at rail 5 x 10 sec  Sit to stand with arm pull at rail 3 x 5 \" get tall\"  Kickstand stance 3 x 10 sec each 6\" step  Standing alternate foot tap 2x 10 6\"  LAQ 5 lbs each 3x 10   Black tband retrowalk x5     EDUCATION:      Goals:  1. Increase   R hip joint PROM to   10  deg extension, 25 deg IR, and less painful pinch into flexion to 100 deg  2. Increase R hip abduction strength to 3+/5 strength   3. Eliminate compensatory gait pattern to normalize gait   4. Improve outcome score by 5 points  5. Improve sit to stand to 3 reps from standard chair without pushoff  6. Independent Home exercise program             "

## 2024-10-01 ENCOUNTER — APPOINTMENT (OUTPATIENT)
Dept: PRIMARY CARE | Facility: CLINIC | Age: 74
End: 2024-10-01
Payer: MEDICARE

## 2024-10-11 ENCOUNTER — APPOINTMENT (OUTPATIENT)
Dept: ORTHOPEDIC SURGERY | Facility: CLINIC | Age: 74
End: 2024-10-11
Payer: MEDICARE

## 2024-10-11 VITALS — WEIGHT: 238.6 LBS | BODY MASS INDEX: 38.35 KG/M2 | HEIGHT: 66 IN

## 2024-10-11 DIAGNOSIS — M25.859 FEMORAL ACETABULAR IMPINGEMENT: ICD-10-CM

## 2024-10-11 DIAGNOSIS — M16.11 PRIMARY OSTEOARTHRITIS OF RIGHT HIP: Primary | ICD-10-CM

## 2024-10-11 DIAGNOSIS — M17.11 PRIMARY OSTEOARTHRITIS OF RIGHT KNEE: ICD-10-CM

## 2024-10-11 DIAGNOSIS — M11.861 CALCIUM PYROPHOSPHATE DEPOSITION DISEASE (CPDD) OF RIGHT KNEE: ICD-10-CM

## 2024-10-11 PROCEDURE — 20610 DRAIN/INJ JOINT/BURSA W/O US: CPT | Performed by: STUDENT IN AN ORGANIZED HEALTH CARE EDUCATION/TRAINING PROGRAM

## 2024-10-11 PROCEDURE — 1159F MED LIST DOCD IN RCRD: CPT | Performed by: STUDENT IN AN ORGANIZED HEALTH CARE EDUCATION/TRAINING PROGRAM

## 2024-10-11 PROCEDURE — 1036F TOBACCO NON-USER: CPT | Performed by: STUDENT IN AN ORGANIZED HEALTH CARE EDUCATION/TRAINING PROGRAM

## 2024-10-11 PROCEDURE — 3008F BODY MASS INDEX DOCD: CPT | Performed by: STUDENT IN AN ORGANIZED HEALTH CARE EDUCATION/TRAINING PROGRAM

## 2024-10-11 PROCEDURE — 99204 OFFICE O/P NEW MOD 45 MIN: CPT | Performed by: STUDENT IN AN ORGANIZED HEALTH CARE EDUCATION/TRAINING PROGRAM

## 2024-10-11 RX ORDER — LIDOCAINE HYDROCHLORIDE 10 MG/ML
8 INJECTION, SOLUTION INFILTRATION; PERINEURAL
Status: COMPLETED | OUTPATIENT
Start: 2024-10-11 | End: 2024-10-11

## 2024-10-11 RX ORDER — BUTYROSPERMUM PARKII(SHEA BUTTER), SIMMONDSIA CHINENSIS (JOJOBA) SEED OIL, ALOE BARBADENSIS LEAF EXTRACT .01; 1; 3.5 G/100G; G/100G; G/100G
1 LIQUID TOPICAL EVERY 12 HOURS
COMMUNITY

## 2024-10-11 RX ORDER — BUPIVACAINE HYDROCHLORIDE 5 MG/ML
4 INJECTION, SOLUTION PERINEURAL
Status: COMPLETED | OUTPATIENT
Start: 2024-10-11 | End: 2024-10-11

## 2024-10-11 RX ORDER — TRIAMCINOLONE ACETONIDE 40 MG/ML
80 INJECTION, SUSPENSION INTRA-ARTICULAR; INTRAMUSCULAR
Status: COMPLETED | OUTPATIENT
Start: 2024-10-11 | End: 2024-10-11

## 2024-10-11 ASSESSMENT — PAIN - FUNCTIONAL ASSESSMENT: PAIN_FUNCTIONAL_ASSESSMENT: 0-10

## 2024-10-11 ASSESSMENT — PAIN DESCRIPTION - DESCRIPTORS: DESCRIPTORS: ACHING;THROBBING

## 2024-10-11 ASSESSMENT — PAIN SCALES - GENERAL: PAINLEVEL_OUTOF10: 5 - MODERATE PAIN

## 2024-10-11 NOTE — PROGRESS NOTES
Cristine Perez MD   Adult Reconstruction and Joint Replacement Surgery  Phone: [phone]     Fax: [phone]       Name: Tara Clifford  Age: 74 y.o.   : 1950   Date of Visit: 10/11/2024    Integrated INITIAL CONSULTATION    CC: Right knee pain, also right hip pain    HPI:  This patient presents with several years of RIGHT knee pain. They were referred by Dr. Arcos.    Patient has tried the following Ice, Tylenol (arthritis dosing) , Activity modification, Physical therapy, Hyaluronic acid injections, and Xray. Date of last steroid injection: euflexxa only, years ago. Patient does have pain at night. Patient does not report falls related to this problem. Patient is able to walk 2-3 blocks. Patient is currently using nothing as assistive device. Primarily complains of groin, buttock, and lateral hip pain. The right KNEE pain occurs anterior. Patient has difficulty with donning and doffing shoes and socks, stairs, and getting in/out of car . The pain is significantly impacting their ability to perform activities of daily living. Patient reports no longer able to do activities such as walking in park.     Focused History  PMH: Reviewed and PE/DVT: no  PSH: Reviewed , Hip/Knee replacement: no, Hip/Knee surgery: no, Anesthesia complications: no, Spine surgery: no, Surgical infection: no, and Weight loss surgery: no  Meds: Reviewed, Current Anticoagulants: no, Weight loss medication: no, and Current Opioids: no  Allergies: Reviewed  and The patient reports no contraindications or allergies to cephalosporins, aspirin, NSAIDs or opioids, except as noted above.  FH: No family history of any bleeding or clotting disorders.  SHx: Reviewed, Occupation: retired, Current smoker: no, EtOH intake weekly: wine, Social support: unk, and Preferred physical activities: swim, pool  Dental Hx: Last routine cleaning: 10/10/24 and Discussed that all invasive dental work must be completed at least 3 months prior to  joint replacement surgery. Patient understands they are to avoid any invasive dental work 3-6 months post-surgically.   Religious: no    PROMS/HISTORY  PROMs   No questionnaires on file.     Past Medical History:   Diagnosis Date    Basal cell carcinoma (BCC)     Cataract     Dry eyes     PVD (posterior vitreous detachment), both eyes        Past Medical History:   Diagnosis Date    Basal cell carcinoma (BCC)     Cataract     Dry eyes     PVD (posterior vitreous detachment), both eyes      Documented in chart and reviewed.     Past Surgical History:   Procedure Laterality Date    CAPSULOTOMY Bilateral 2010    CATARACT EXTRACTION W/  INTRAOCULAR LENS IMPLANT Bilateral 2008    OTHER SURGICAL HISTORY  12/07/2020    Inguinal hernia repair       Allergies: She is allergic to morphine and adhesive tape-silicones.     Medications:  Current Outpatient Medications   Medication Instructions    acetaminophen (TYLENOL 8 HOUR) 650 mg, oral, 2 times daily, Do not crush, chew, or split.     amLODIPine (NORVASC) 5 mg, oral, Daily    ascorbic acid (Vitamin C) 1,000 mg tablet Every 24 hours    diphenhydrAMINE-acetaminophen (Tylenol PM Extra Strength)  mg per tablet Every 24 hours    glucosamine HCl 1,500 mg tablet 1 tablet, oral, Daily RT    Lactobacillus acidophilus 10 billion cell capsule 1 capsule, oral, Daily RT    magnesium oxide 400 mg magnesium capsule as directed Orally    multivit-min-folic acid-vit K (Multi For Her 50 Plus) 400-80 mcg capsule as directed Orally    venlafaxine XR (EFFEXOR-XR) 150 mg, oral, Daily       Family History   Problem Relation Name Age of Onset    No Known Problems Mother      No Known Problems Father       Documented in chart and reviewed.     Social History     Tobacco Use    Smoking status: Never    Smokeless tobacco: Never   Substance Use Topics    Alcohol use: Not Currently        Review of Systems: Review of systems completed with medical assistant intake. Please refer to this  note.     Physical Exam:  BMI: 39.    General: The patient is well appearing and has an appropriate affect.     Neurological Examination: SILT in SPN/DPN/Sural/Saphenous/Tibial nerves. 5/5 EHL, FHL, Tibial anterior, Gastrocnemius. Coordination grossly intact.     Cardiovascular Exam: Capillary refill <2 seconds. No edema. No varicose veins.     Lymphatic Examination: There is no obvious lymphatic swelling present around the involved joint.    Skin Exam: Skin around the pertinent joint is without evidence of infection or rash.    Gait: The patient ambulates with a coxalgic gait.     Lumbar spine:    No tenderness to palpation midline.    Negative straight leg raise bilaterally.    Right Hip Examination:  Gait: Coxalgic gait.    Examination of the hip reveals the skin to be intact.    There is mild tenderness over the greater trochanter.    There is no obvious swelling.    There is a positive Stinchfield test.    Range of motion is: full extension to 95 degrees of flexion.    The hip internally rotates to 15 degrees and externally rotates to 40 degrees.    Abduction is 40 degrees and adduction is 20 degrees.    There is groin and buttock pain with hip motion.    There is a negative straight leg raise.    Abductor strength 4/5.    Left Hip Examination:  Examination of the hip reveals the skin to be intact.    There is no tenderness over the greater trochanter.    There is no obvious swelling.    There is a negative Stinchfield test.    Range of motion is full extension to 95 degrees of flexion.    The hip internally rotates to 20 and externally rotates 40 degrees.    Abduction is 50 degrees and adduction is 20 degrees.    There is no groin and buttock pain with hip motion.    There is a negative straight leg raise.    Abductor strength 4+/5.    Right Knee Examination:  Examination of the right knee reveals the skin to be intact. There is no obvious swelling.    There is no tenderness to palpation.    Range of motion is  "full extension to 120 degrees of flexion.    The knee is stable.    There is no grinding with range of motion.    There is no patellofemoral crepitus.    Left Knee Examination:  Examination of the left knee reveals the skin to be intact. There is no obvious swelling.    There is no tenderness to palpation.    Range of motion is full extension to 120 degrees of flexion.    The knee is stable.    There is no grinding with range of motion.    There is no patellofemoral crepitus.    Prior Labs:   Prior Labs:   Lab Results   Component Value Date    WBC 5.1 03/29/2024    HGB 15.0 03/29/2024    HCT 47.9 (H) 03/29/2024    MCV 90 03/29/2024     03/29/2024      No results found for: \"INR\", \"PROTIME\"      Lab Results   Component Value Date    GLUCOSE 82 03/29/2024    CALCIUM 9.8 03/29/2024     03/29/2024    K 4.6 03/29/2024    CO2 29 03/29/2024     03/29/2024    BUN 17 03/29/2024    CREATININE 0.71 03/29/2024      No results found for: \"CKTOTAL\", \"CKMB\", \"CKMBINDEX\", \"TROPONINI\"   Lab Results   Component Value Date    HGBA1C 5.7 06/07/2021         No results found for: \"CRP\"   No results found for: \"SEDRATE\".    Radiographs:  Radiographs were personally reviewed today with the patient. There is evidence of moderate RIGHT  hip osteoarthritis without bone on bone apposition.  This occurs in the setting of femoral acetabular impingement.    Impression:  This patient presents with moderate RIGHT  hip osteoarthritis without bone on bone apposition. Patient has tried and failed appropriate conservative measures and now has limitation in ADL's. The patient is not a candidate for surgery at this time.    Diagnosis:   Primary osteoarthritis of right hip    Femoral acetabular impingement     Recommendations / Plan:    I have discussed the options in detail with the patient. We have discussed anti-inflammatory medication, activity modification, physical therapy, corticosteroid injections, and total hip replacement " surgery. The patient has not yet exhausted all conservative treatment measures.    The risks and benefits of all these treatment options have been discussed in detail.     The patient has tried at least 3 months of the above conservative treatments and continues to have disabling pain, impaired activities of daily living and worsened quality of life.  Reviewed the surgical optimization steps to optimize their chances for a successful joint replacement surgery.      Currently their BMI is 38.  Discussed that obesity is a risk factor for continued progression of osteoarthritis. Each pound of weight loss offloads their hip and knee joints by 3-6 pounds.  The most effective of these options is weight loss mainly through restricting caloric intake.     A physical therapy prescription was ordered for the patient.  Patient will continue their home exercise program. Strategies for pain management using over-the-counter anti-inflammatory medications reviewed.  Discussed the potential benefit of a steroid injection and the patient was referred to my nonsurgical sports partners for consideration. Encouraged them to maintain range of motion and strength around the hip and knee joints.  They will continue to implement these strategies in addressing their pain.      Patient was advised to seek guidance on NSAIDs from their primary care physician as careful monitoring of kidney function, heart function, blood pressure, and other health considerations is important while on these medications.      Recommend the patient continue optimizing nonsurgical treatment interventions as outlined above for management of their arthritis.  I would be happy to see them again at any point to discuss surgery if they are more optimized or to review progress with nonsurgical treatment of arthritis.  The patient verbalizes understanding with the recommendations and treatment plan as outlined above and is in agreement.  Questions were addressed.    L  Inj/Asp: R knee on 10/11/2024 10:56 AM  Indications: pain and joint swelling  Details: 22 G needle, lateral approach  Medications: 80 mg triamcinolone acetonide 40 mg/mL; 8 mL lidocaine 10 mg/mL (1 %); 4 mL BUPivacaine HCl 0.5 % (5 mg/mL)  Outcome: tolerated well, no immediate complications  Procedure, treatment alternatives, risks and benefits explained, specific risks discussed. Consent was given by the patient. Immediately prior to procedure a time out was called to verify the correct patient, procedure, equipment, support staff and site/side marked as required. Patient was prepped and draped in the usual sterile fashion.             _____________  Cristine Perez MD   Attending Orthopaedic Surgeon  [facility]    [facility]       This office note was transcribed with dictation software.  Please excuse any typographical errors, program misunderstandings leading to inadvertent insertions or deletions of inappropriate wording, pronoun errors and other unintentional transcription errors not noticed on proof-reading.

## 2024-10-11 NOTE — LETTER
2024     Benson Arcos MD  [address] [place] [address] Dr Beronica NguyenGallup, [facility] [address]  [facility] [address]    Patient: Tara Clifford   YOB: 1950   Date of Visit: 10/11/2024       Dear Dr. Benson Arcos MD:    Thank you for referring Tara Clifford to me for evaluation. Below are my notes for this consultation.  If you have questions, please do not hesitate to call me. I look forward to following your patient along with you.       Sincerely,     Cristine Perez MD      CC: No Recipients  ______________________________________________________________________________________     Cristine Perez MD   Adult Reconstruction and Joint Replacement Surgery  Phone: [phone]     Fax: [phone]       Name: Tara Clifford  Age: 74 y.o.   : 1950   Date of Visit: 10/11/2024    Integrated INITIAL CONSULTATION    CC: Right knee pain, also right hip pain    HPI:  This patient presents with several years of RIGHT knee pain. They were referred by Dr. Arcos.    Patient has tried the following Ice, Tylenol (arthritis dosing) , Activity modification, Physical therapy, Hyaluronic acid injections, and Xray. Date of last steroid injection: euflexxa only, years ago. Patient does have pain at night. Patient does not report falls related to this problem. Patient is able to walk 2-3 blocks. Patient is currently using nothing as assistive device. Primarily complains of groin, buttock, and lateral hip pain. The right KNEE pain occurs anterior. Patient has difficulty with donning and doffing shoes and socks, stairs, and getting in/out of car . The pain is significantly impacting their ability to perform activities of daily living. Patient reports no longer able to do activities such as walking in park.     Focused History  PMH: Reviewed and PE/DVT: no  PSH: Reviewed , Hip/Knee replacement: no, Hip/Knee surgery: no, Anesthesia complications: no, Spine surgery: no, Surgical infection: no, and Weight loss  surgery: no  Meds: Reviewed, Current Anticoagulants: no, Weight loss medication: no, and Current Opioids: no  Allergies: Reviewed  and The patient reports no contraindications or allergies to cephalosporins, aspirin, NSAIDs or opioids, except as noted above.  FH: No family history of any bleeding or clotting disorders.  SHx: Reviewed, Occupation: retired, Current smoker: no, EtOH intake weekly: wine, Social support: unk, and Preferred physical activities: swim, pool  Dental Hx: Last routine cleaning: 10/10/24 and Discussed that all invasive dental work must be completed at least 3 months prior to joint replacement surgery. Patient understands they are to avoid any invasive dental work 3-6 months post-surgically.   Rastafarian: no    PROMS/HISTORY  PROMs   No questionnaires on file.     Past Medical History:   Diagnosis Date   • Basal cell carcinoma (BCC)    • Cataract    • Dry eyes    • PVD (posterior vitreous detachment), both eyes        Past Medical History:   Diagnosis Date   • Basal cell carcinoma (BCC)    • Cataract    • Dry eyes    • PVD (posterior vitreous detachment), both eyes      Documented in chart and reviewed.     Past Surgical History:   Procedure Laterality Date   • CAPSULOTOMY Bilateral 2010   • CATARACT EXTRACTION W/  INTRAOCULAR LENS IMPLANT Bilateral 2008   • OTHER SURGICAL HISTORY  12/07/2020    Inguinal hernia repair       Allergies: She is allergic to morphine and adhesive tape-silicones.     Medications:  Current Outpatient Medications   Medication Instructions   • acetaminophen (TYLENOL 8 HOUR) 650 mg, oral, 2 times daily, Do not crush, chew, or split.    • amLODIPine (NORVASC) 5 mg, oral, Daily   • ascorbic acid (Vitamin C) 1,000 mg tablet Every 24 hours   • diphenhydrAMINE-acetaminophen (Tylenol PM Extra Strength)  mg per tablet Every 24 hours   • glucosamine HCl 1,500 mg tablet 1 tablet, oral, Daily RT   • Lactobacillus acidophilus 10 billion cell capsule 1 capsule, oral,  Daily RT   • magnesium oxide 400 mg magnesium capsule as directed Orally   • multivit-min-folic acid-vit K (Multi For Her 50 Plus) 400-80 mcg capsule as directed Orally   • venlafaxine XR (EFFEXOR-XR) 150 mg, oral, Daily       Family History   Problem Relation Name Age of Onset   • No Known Problems Mother     • No Known Problems Father       Documented in chart and reviewed.     Social History     Tobacco Use   • Smoking status: Never   • Smokeless tobacco: Never   Substance Use Topics   • Alcohol use: Not Currently        Review of Systems: Review of systems completed with medical assistant intake. Please refer to this note.     Physical Exam:  BMI: 39.    General: The patient is well appearing and has an appropriate affect.     Neurological Examination: SILT in SPN/DPN/Sural/Saphenous/Tibial nerves. 5/5 EHL, FHL, Tibial anterior, Gastrocnemius. Coordination grossly intact.     Cardiovascular Exam: Capillary refill <2 seconds. No edema. No varicose veins.     Lymphatic Examination: There is no obvious lymphatic swelling present around the involved joint.    Skin Exam: Skin around the pertinent joint is without evidence of infection or rash.    Gait: The patient ambulates with a coxalgic gait.     Lumbar spine:    No tenderness to palpation midline.    Negative straight leg raise bilaterally.    Right Hip Examination:  Gait: Coxalgic gait.    Examination of the hip reveals the skin to be intact.    There is mild tenderness over the greater trochanter.    There is no obvious swelling.    There is a positive Stinchfield test.    Range of motion is: full extension to 95 degrees of flexion.    The hip internally rotates to 15 degrees and externally rotates to 40 degrees.    Abduction is 40 degrees and adduction is 20 degrees.    There is groin and buttock pain with hip motion.    There is a negative straight leg raise.    Abductor strength 4/5.    Left Hip Examination:  Examination of the hip reveals the skin to be  "intact.    There is no tenderness over the greater trochanter.    There is no obvious swelling.    There is a negative Stinchfield test.    Range of motion is full extension to 95 degrees of flexion.    The hip internally rotates to 20 and externally rotates 40 degrees.    Abduction is 50 degrees and adduction is 20 degrees.    There is no groin and buttock pain with hip motion.    There is a negative straight leg raise.    Abductor strength 4+/5.    Right Knee Examination:  Examination of the right knee reveals the skin to be intact. There is no obvious swelling.    There is no tenderness to palpation.    Range of motion is full extension to 120 degrees of flexion.    The knee is stable.    There is no grinding with range of motion.    There is no patellofemoral crepitus.    Left Knee Examination:  Examination of the left knee reveals the skin to be intact. There is no obvious swelling.    There is no tenderness to palpation.    Range of motion is full extension to 120 degrees of flexion.    The knee is stable.    There is no grinding with range of motion.    There is no patellofemoral crepitus.    Prior Labs:   Prior Labs:   Lab Results   Component Value Date    WBC 5.1 03/29/2024    HGB 15.0 03/29/2024    HCT 47.9 (H) 03/29/2024    MCV 90 03/29/2024     03/29/2024      No results found for: \"INR\", \"PROTIME\"      Lab Results   Component Value Date    GLUCOSE 82 03/29/2024    CALCIUM 9.8 03/29/2024     03/29/2024    K 4.6 03/29/2024    CO2 29 03/29/2024     03/29/2024    BUN 17 03/29/2024    CREATININE 0.71 03/29/2024      No results found for: \"CKTOTAL\", \"CKMB\", \"CKMBINDEX\", \"TROPONINI\"   Lab Results   Component Value Date    HGBA1C 5.7 06/07/2021         No results found for: \"CRP\"   No results found for: \"SEDRATE\".    Radiographs:  Radiographs were personally reviewed today with the patient. There is evidence of moderate RIGHT  hip osteoarthritis without bone on bone apposition.  This occurs " in the setting of femoral acetabular impingement.    Impression:  This patient presents with moderate RIGHT  hip osteoarthritis without bone on bone apposition. Patient has tried and failed appropriate conservative measures and now has limitation in ADL's. The patient is not a candidate for surgery at this time.    Diagnosis:   Primary osteoarthritis of right hip    Femoral acetabular impingement     Recommendations / Plan:    I have discussed the options in detail with the patient. We have discussed anti-inflammatory medication, activity modification, physical therapy, corticosteroid injections, and total hip replacement surgery. The patient has not yet exhausted all conservative treatment measures.    The risks and benefits of all these treatment options have been discussed in detail.     The patient has tried at least 3 months of the above conservative treatments and continues to have disabling pain, impaired activities of daily living and worsened quality of life.  Reviewed the surgical optimization steps to optimize their chances for a successful joint replacement surgery.      Currently their BMI is 38.  Discussed that obesity is a risk factor for continued progression of osteoarthritis. Each pound of weight loss offloads their hip and knee joints by 3-6 pounds.  The most effective of these options is weight loss mainly through restricting caloric intake.     A physical therapy prescription was ordered for the patient.  Patient will continue their home exercise program. Strategies for pain management using over-the-counter anti-inflammatory medications reviewed.  Discussed the potential benefit of a steroid injection and the patient was referred to my nonsurgical sports partners for consideration. Encouraged them to maintain range of motion and strength around the hip and knee joints.  They will continue to implement these strategies in addressing their pain.      Patient was advised to seek guidance on NSAIDs  from their primary care physician as careful monitoring of kidney function, heart function, blood pressure, and other health considerations is important while on these medications.      Recommend the patient continue optimizing nonsurgical treatment interventions as outlined above for management of their arthritis.  I would be happy to see them again at any point to discuss surgery if they are more optimized or to review progress with nonsurgical treatment of arthritis.  The patient verbalizes understanding with the recommendations and treatment plan as outlined above and is in agreement.  Questions were addressed.    L Inj/Asp: R knee on 10/11/2024 10:56 AM  Indications: pain and joint swelling  Details: 22 G needle, lateral approach  Medications: 80 mg triamcinolone acetonide 40 mg/mL; 8 mL lidocaine 10 mg/mL (1 %); 4 mL BUPivacaine HCl 0.5 % (5 mg/mL)  Outcome: tolerated well, no immediate complications  Procedure, treatment alternatives, risks and benefits explained, specific risks discussed. Consent was given by the patient. Immediately prior to procedure a time out was called to verify the correct patient, procedure, equipment, support staff and site/side marked as required. Patient was prepped and draped in the usual sterile fashion.             _____________  Cristine Perez MD   Attending Orthopaedic Surgeon  [facility]    [facility]       This office note was transcribed with dictation software.  Please excuse any typographical errors, program misunderstandings leading to inadvertent insertions or deletions of inappropriate wording, pronoun errors and other unintentional transcription errors not noticed on proof-reading.

## 2024-10-17 ENCOUNTER — APPOINTMENT (OUTPATIENT)
Dept: PHYSICAL THERAPY | Facility: CLINIC | Age: 74
End: 2024-10-17
Payer: MEDICARE

## 2024-10-31 ENCOUNTER — TREATMENT (OUTPATIENT)
Dept: PHYSICAL THERAPY | Facility: CLINIC | Age: 74
End: 2024-10-31
Payer: MEDICARE

## 2024-10-31 DIAGNOSIS — G89.29 CHRONIC BACK PAIN: ICD-10-CM

## 2024-10-31 DIAGNOSIS — R26.81 GAIT INSTABILITY: Primary | ICD-10-CM

## 2024-10-31 DIAGNOSIS — M54.9 CHRONIC BACK PAIN: ICD-10-CM

## 2024-10-31 DIAGNOSIS — Z91.81 AT RISK FOR INJURY RELATED TO FALL: ICD-10-CM

## 2024-10-31 DIAGNOSIS — R53.82 CHRONIC FATIGUE: ICD-10-CM

## 2024-10-31 PROCEDURE — 97110 THERAPEUTIC EXERCISES: CPT | Mod: GP | Performed by: PHYSICAL THERAPIST

## 2024-11-04 ENCOUNTER — APPOINTMENT (OUTPATIENT)
Dept: PRIMARY CARE | Facility: CLINIC | Age: 74
End: 2024-11-04
Payer: MEDICARE

## 2024-11-04 ENCOUNTER — OFFICE VISIT (OUTPATIENT)
Facility: CLINIC | Age: 74
End: 2024-11-04
Payer: MEDICARE

## 2024-11-04 VITALS
BODY MASS INDEX: 36.37 KG/M2 | SYSTOLIC BLOOD PRESSURE: 124 MMHG | DIASTOLIC BLOOD PRESSURE: 90 MMHG | WEIGHT: 240 LBS | HEART RATE: 93 BPM | OXYGEN SATURATION: 99 % | HEIGHT: 68 IN

## 2024-11-04 DIAGNOSIS — Z00.00 ANNUAL PHYSICAL EXAM: ICD-10-CM

## 2024-11-04 DIAGNOSIS — F41.9 ANXIETY: ICD-10-CM

## 2024-11-04 DIAGNOSIS — M17.9 OSTEOARTHRITIS OF KNEE, UNSPECIFIED LATERALITY, UNSPECIFIED OSTEOARTHRITIS TYPE: ICD-10-CM

## 2024-11-04 DIAGNOSIS — I10 HYPERTENSION, UNSPECIFIED TYPE: Primary | ICD-10-CM

## 2024-11-04 DIAGNOSIS — Z23 FLU VACCINE NEED: ICD-10-CM

## 2024-11-04 DIAGNOSIS — I10 PRIMARY HYPERTENSION: ICD-10-CM

## 2024-11-04 PROCEDURE — G0008 ADMIN INFLUENZA VIRUS VAC: HCPCS | Performed by: INTERNAL MEDICINE

## 2024-11-04 PROCEDURE — 99214 OFFICE O/P EST MOD 30 MIN: CPT | Performed by: INTERNAL MEDICINE

## 2024-11-04 PROCEDURE — 90662 IIV NO PRSV INCREASED AG IM: CPT | Performed by: INTERNAL MEDICINE

## 2024-11-04 PROCEDURE — 1126F AMNT PAIN NOTED NONE PRSNT: CPT | Performed by: INTERNAL MEDICINE

## 2024-11-04 PROCEDURE — 3008F BODY MASS INDEX DOCD: CPT | Performed by: INTERNAL MEDICINE

## 2024-11-04 PROCEDURE — 3074F SYST BP LT 130 MM HG: CPT | Performed by: INTERNAL MEDICINE

## 2024-11-04 PROCEDURE — 3080F DIAST BP >= 90 MM HG: CPT | Performed by: INTERNAL MEDICINE

## 2024-11-04 RX ORDER — AMLODIPINE BESYLATE 5 MG/1
5 TABLET ORAL 2 TIMES DAILY
Qty: 180 TABLET | Refills: 3 | Status: SHIPPED | OUTPATIENT
Start: 2024-11-04 | End: 2025-11-04

## 2024-11-04 ASSESSMENT — PAIN SCALES - GENERAL: PAINLEVEL_OUTOF10: 0-NO PAIN

## 2024-11-04 NOTE — PROGRESS NOTES
"Subjective   Patient ID: Tara Clifford is a 74 y.o. female who presents for Blood Pressure Check (4 month follow-up).    EPOV:    HTN    ATS    Osteoarthritis L knee-    R Femoral acetabular impingement    HPI     Seen by PT and Dr Perez - SHANITA knee cortisone injection.    Review of Systems      Constitutional: Negative for activity change, appetite change, chills, fatigue and fever.   HENT: Negative for congestion, ear pain, rhinorrhea, sinus pain and sore throat.   Eyes: Negative for pain, discharge and redness.   Respiratory: Negative for cough, shortness of breath and wheezing.   Cardiovascular: Negative for chest pain.   Gastrointestinal: Negative for abdominal pain.   Skin: Negative for rash.    Rheum- R hip pain and L knee pain    Objective   Pulse 93   Ht 1.715 m (5' 7.5\")   Wt 109 kg (240 lb)   SpO2 99%   BMI 37.03 kg/m²     126/90    Physical Exam    WDWN NAD    No JVP elevation. No palpable Lymph Nodes. No Thyromegaly    HEENT- Neg    CVS-NL S1/S2 . No MRG    Lungs-CTA. B/S= B/L    Abdomen-Soft, Non-tender. No masses or HSM    Extremities: No C/C/E    Skin-No abnormal moles/rash        Assessment/Plan        HTN- -Goal < 130/80-  Encouraged sodium restriction, DASH or Mediterranean diet  -Increase  Amlodipine to 7.5 mg daily.  Start Magnesium Glycinate 200-400 mg at bedtime.      ATS- Increase physical activity and continue with Venlafaxine 150 mg daily    Osteoarthritis L knee- Tylenol Arthritis PRN    R Femoral acetabular impingement    Elevated BMI-  Increase protein/fish/fiber intake and limit processed/refined carbohydrates and added sugars. Increased physical activity to a minimum of 150 minutes of moderate exercise per week. Recommend dietician consult.     Flu Vax    Call/My chart/ follow with any questions    Follow up 3 months/PRN     "

## 2024-12-09 ENCOUNTER — APPOINTMENT (OUTPATIENT)
Dept: PRIMARY CARE | Facility: CLINIC | Age: 74
End: 2024-12-09
Payer: MEDICARE

## 2025-01-06 ENCOUNTER — APPOINTMENT (OUTPATIENT)
Dept: PRIMARY CARE | Facility: CLINIC | Age: 75
End: 2025-01-06
Payer: MEDICARE

## 2025-01-06 DIAGNOSIS — Z00.00 ANNUAL PHYSICAL EXAM: ICD-10-CM

## 2025-01-06 NOTE — PROGRESS NOTES
Reason for Nutrition Visit:  It was a pleasure meeting Ms. Clifford today to discuss diet and nutrition as part of the  [facility] program.    Medication Documentation Review Audit       Reviewed by Carolyn Batista LPN (Licensed Nurse) on 10/11/24 at 1022      Medication Order Taking? Sig Documenting Provider Last Dose Status   acetaminophen (Tylenol 8 Hour) 650 mg ER tablet [id number] Yes Take 1 tablet (650 mg) by mouth 2 times a day. Do not crush, chew, or split. Historical Provider, MD Taking Active   amLODIPine (Norvasc) 5 mg tablet [id number] Yes Take 1 tablet (5 mg) by mouth once daily. Benson Arcos MD Taking Active   ascorbic acid (Vitamin C) 1,000 mg tablet [id number] Yes once every 24 hours. Historical Provider, MD Taking Active   diphenhydrAMINE-acetaminophen (Tylenol PM Extra Strength)  mg per tablet [id number] Yes once every 24 hours. Historical Provider, MD Taking Active   glucosamine HCl 1,500 mg tablet [id number] Yes Take 1 tablet by mouth once daily. Historical Provider, MD Taking Active   Lactobacillus acidophilus 10 billion cell capsule [id number] Yes Take 1 capsule by mouth once daily. Historical Provider, MD Taking Active   magnesium oxide 400 mg magnesium capsule [id number] Yes as directed Orally Historical Provider, MD Taking Active   multivit-min-folic acid-vit K (Multi For Her 50 Plus) 400-80 mcg capsule [id number] Yes as directed Orally Historical Provider, MD Taking Active   saccharomyces boulardii (Florastor) 250 mg capsule [id number]  Take 1 capsule (250 mg) by mouth every 12 hours. Historical Provider, MD  Active   venlafaxine XR (Effexor-XR) 150 mg 24 hr capsule [id number] Yes Take 1 capsule (150 mg) by mouth once daily. Benson Arcos MD Taking Active                     Past Medical Hx:  Patient Active Problem List   Diagnosis    Bilateral lower extremity edema    Dense breasts    Depression    Dry eyes, bilateral    Elevated lipids    Fatigue    Floaters    Hyperopia    Pseudophakia     PVD (posterior vitreous detachment), both eyes    Chronic back pain    Chronic bilateral low back pain without sciatica    Thoracic back pain    Agatston coronary artery calcium score less than 100    Arthritis of foot    Arthritis of right acromioclavicular joint    Basal cell carcinoma (BCC) of skin of nose    DJD (degenerative joint disease) of knee    Osteoarthritis of knee    Primary osteoarthritis of right knee    DJD (degenerative joint disease), lumbar    Osteopenia    Plantar fascia syndrome    Plantar fasciitis    Atrophic vaginitis    Notalgia paresthetica    Postmenopausal bone loss    Post-menopausal osteoporosis    S/P hysterectomy with oophorectomy    Prolapse of urethra    Obesity, morbid (Multi)    At risk for injury related to fall    Gait instability    Femoral acetabular impingement        Weight change:  Her current weight is 237lbs.  She would like her weight to be closer to 190lbs. The last time she was at that weight was in August of 2019.    Significant Weight Change: No    Lab Results   Component Value Date    HGBA1C 5.7 06/07/2021    CHOL 235 (H) 09/25/2023    LDLCALC 139 (H) 03/21/2023    TRIG 240 (H) 09/25/2023    HDL 62.6 09/25/2023    LDLF 124 (H) 09/25/2023        Food and Nutrition Hx:  She would like to lose weight.  She is cooking just for herself.  Her  passed away from a brain tumor during COVID.      She reports drinking Muscle Milk protein drink (25g protein) for breakfast sometimes with a banana. She does a lot of grazing throughout the day.  She will get Heinens prepared foods for lunch or dinner.  She snacks a lot throughout the day.  She snacks on Pop Corners chips (uses small snack bags).  She struggles with portion control if she buys large bags of snacks.  She has been snacking on a lot of sweets since the holidays started.  Some days she will have 10-12 cookies.      She is eating out about once a week.  She is eating fish/shellfish once a week, usually salmon.   She will occasionally incorporate shrimp.      24 Hour Food Recall:  Breakfast (11-11:30am): CorePower protein drink (30g protein)  Lunch (3:00pm): Greek yogurt fruit on the bottom, blueberries  Dinner (7:00pm): salad-taylor lettuce, cheese, 4-5oz Uriarte broil, Garlic Expressions salad dressing, fried onions; 1 small bag of Pop Corners  Snack (8:30pm): 2 packages of Ana María Doones   Snack (11:30pm): Healthy Choice fudge bar    Beverages: water-24oz mixed with Arbonne fizz stick and Emergen-C; diet gingerale-1 can a day; Arnold Powell-on occasion; Arbonne detox tea-1 cup a day; alcohol-1-2 drinks a week    Allergies: None  Intolerance: None    GI Symptoms : None She is having a bowel movement everyday.    Exercise: Swimming, pool exercise classes.  She was going 2 days a week, but would like to do 3 days a week.      Sleep duration/quality : 7-8 hours a night.  She reads before bed.      Supplements: Multivitamin, Vitamin C, Magnesium, Tumeric, Probiotic, and glucosamine, Calcium + D  daily    Cravings: Sweet  Energy Levels: Fluctuates      Estimated Nutrient Needs:    Calories for Weight Maintenance: 4661-9663 (Keith St. Jeor x 1.2-1.3 activity factor)  Calories for Weight Loss: 8244-9048  Protein Needs: 115-130g/day (0.6-0.7g/lb DBW, 190lbs)     Nutrition Diagnosis:    Diagnosis Statement 1:  Diagnosis Status: New  Diagnosis : Obese related to  food- and nutrition-related knowledge deficit, excessive energy intake, physical inactivity  as evidenced by  BMI of 37, reports of consuming high calorie foods and large quantities    Diagnosis Statement 2:  Diagnosis Status: New  Diagnosis : Altered nutrition related lab values  related to  dietary and physiological factors  as evidenced by  elevated triglycerides (240mg/dl), elevated HgbA1c (5.7%)    Diagnosis Statement 3:   Diagnosis Status: New  Diagnosis : Inadequate protein intake  related to  food- and nutrition-related knowledge deficit regarding appropriate  protein intake  as evidenced by  food recall showing she is not meeting the recommended 115-130g protein per day    Nutrition Interventions:  Increased Fiber Diet and Increased Protein Diet, Decreased Energy Diet      Nutrition Goals:  Nutrition Goals : Blood glucose control  Consistent meal/snack pattern  Initiate Exercise Regimen  Reduce Hgb A1c  Reduce Kcal Intake  Weight Loss  Adequate protein intake  Adequate fiber intake    Nutrition Recommendations:    1) To help create well balanced meals while being mindful of portion sizes, use the plate model for portioning out your meals.  Fill 1/2 of your plate with non-starchy vegetables, 1/4 of your plate with lean protein (~5-6oz per meal), and 1/4 of your plate with complex carbohydrates/whole grains.  Each meal should contain the following 3 components: protein + fiber + healthy fats.    2) Aim for 30-35g fiber daily.  One way to help you reach this goal is to include non-starchy vegetables with both lunch and dinner (at least 1-2 cups).  Be sure to include a wide variety of colorful vegetables throughout the week.  Also, be sure to use 100% whole wheat/whole grain breads/pastas, brown/wild rice, quinoa, oats, beans, lentils, starchy vegetables-potatoes, sweet potatoes, corn, peas, winter squash and limit/avoid white, processed carbohydrates (white bread, white pasta, white rice, etc).    3) Choose 3 out of 5 of the following daily to help you reach your daily fiber goals:  -1 cup berries (4-5g fiber)  -1/2 cup beans (7g fiber)  -1/4 cup nuts (5g fiber)  -1/3 avocado (4g fiber)  -3 cups dark leafy greens (5g fiber)    4) Ensure you are getting adequate amounts of protein consistently throughout the day.  Your daily protein goal is 115-130g.  Aim for 30-40g per meal and include 5-10g protein at snacks.    5) You can continue to use a pre-made protein shake for breakfast, but be sure to pair this with a source of fiber such as 1 cup of berries.    6) Options for  lunch to help you increase protein and fiber at this meal:  -Greek yogurt (no sugar added) + Pao Crunch cereal + 1 Tbsp rose seeds  -homemade smoothie made with protein powder (Joyce Mata, HOWARD) + fruit + greens + source of healthy fats (seeds-rose/flax, avocado, or natural nut butter)    7) Quick protein options to keep on hand:  -rotisserie chicken  -canned salmon, tuna, sardines  -Amylu chicken products (sausages, meatballs, patties)  -Raman's meals    8) Recommendations for pre-made meal delivery services:  -Factor  -Unrefined TRISTEN  -Zero Doubt Kitchen    9) For better blood sugar regulation:  -space out meals and snacks at least 3-4 hours apart  -do not eat carbohydrates by themselves; pair with protein  -have an overnight fasting window of at least 12-14 hours  -avoid night time snacking    10) Incorporate strength training 2-3 days a week.    Educational Handouts: Use a Plate to Plan Your Meals

## 2025-01-08 ENCOUNTER — APPOINTMENT (OUTPATIENT)
Dept: DERMATOLOGY | Facility: CLINIC | Age: 75
End: 2025-01-08
Payer: MEDICARE

## 2025-01-08 DIAGNOSIS — D22.9 MULTIPLE BENIGN NEVI: Primary | ICD-10-CM

## 2025-01-08 DIAGNOSIS — Z12.83 SCREENING EXAM FOR SKIN CANCER: ICD-10-CM

## 2025-01-08 DIAGNOSIS — L81.4 LENTIGO: ICD-10-CM

## 2025-01-08 DIAGNOSIS — Z85.828 PERSONAL HISTORY OF SKIN CANCER: ICD-10-CM

## 2025-01-08 DIAGNOSIS — T14.8XXA EXCORIATION: ICD-10-CM

## 2025-01-08 DIAGNOSIS — L57.8 ACTINIC SKIN DAMAGE: ICD-10-CM

## 2025-01-08 DIAGNOSIS — L82.1 SEBORRHEIC KERATOSIS: ICD-10-CM

## 2025-01-08 PROCEDURE — 1160F RVW MEDS BY RX/DR IN RCRD: CPT | Performed by: DERMATOLOGY

## 2025-01-08 PROCEDURE — 1159F MED LIST DOCD IN RCRD: CPT | Performed by: DERMATOLOGY

## 2025-01-08 PROCEDURE — 1036F TOBACCO NON-USER: CPT | Performed by: DERMATOLOGY

## 2025-01-08 PROCEDURE — 99213 OFFICE O/P EST LOW 20 MIN: CPT | Performed by: DERMATOLOGY

## 2025-01-08 ASSESSMENT — DERMATOLOGY PATIENT ASSESSMENT
HAVE YOU HAD OR DO YOU HAVE VASCULAR DISEASE: NO
ARE YOU AN ORGAN TRANSPLANT RECIPIENT: NO
DO YOU USE SUNSCREEN: DAILY
DO YOU USE A TANNING BED: NO
DO YOU HAVE IRREGULAR MENSTRUAL CYCLES: NO
ARE YOU ON BIRTH CONTROL: NO
ARE YOU TRYING TO GET PREGNANT: NO
HAVE YOU HAD OR DO YOU HAVE A STAPH INFECTION: NO
DO YOU HAVE ANY NEW OR CHANGING LESIONS: NO

## 2025-01-08 ASSESSMENT — DERMATOLOGY QUALITY OF LIFE (QOL) ASSESSMENT
ARE THERE EXCLUSIONS OR EXCEPTIONS FOR THE QUALITY OF LIFE ASSESSMENT: NO
RATE HOW EMOTIONALLY BOTHERED YOU ARE BY YOUR SKIN PROBLEM (FOR EXAMPLE, WORRY, EMBARRASSMENT, FRUSTRATION): 0 - NEVER BOTHERED
RATE HOW EMOTIONALLY BOTHERED YOU ARE BY YOUR SKIN PROBLEM (FOR EXAMPLE, WORRY, EMBARRASSMENT, FRUSTRATION): 0 - NEVER BOTHERED
RATE HOW BOTHERED YOU ARE BY SYMPTOMS OF YOUR SKIN PROBLEM (EG, ITCHING, STINGING BURNING, HURTING OR SKIN IRRITATION): 6 - ALWAYS BOTHERED
DATE THE QUALITY-OF-LIFE ASSESSMENT WAS COMPLETED: 67213
RATE HOW BOTHERED YOU ARE BY EFFECTS OF YOUR SKIN PROBLEMS ON YOUR ACTIVITIES (EG, GOING OUT, ACCOMPLISHING WHAT YOU WANT, WORK ACTIVITIES OR YOUR RELATIONSHIPS WITH OTHERS): 0 - NEVER BOTHERED
WHAT SINGLE SKIN CONDITION LISTED BELOW IS THE PATIENT ANSWERING THE QUALITY-OF-LIFE ASSESSMENT QUESTIONS ABOUT: NONE OF THE ABOVE
RATE HOW BOTHERED YOU ARE BY SYMPTOMS OF YOUR SKIN PROBLEM (EG, ITCHING, STINGING BURNING, HURTING OR SKIN IRRITATION): 6 - ALWAYS BOTHERED
WHAT SINGLE SKIN CONDITION LISTED BELOW IS THE PATIENT ANSWERING THE QUALITY-OF-LIFE ASSESSMENT QUESTIONS ABOUT: NONE OF THE ABOVE
RATE HOW BOTHERED YOU ARE BY EFFECTS OF YOUR SKIN PROBLEMS ON YOUR ACTIVITIES (EG, GOING OUT, ACCOMPLISHING WHAT YOU WANT, WORK ACTIVITIES OR YOUR RELATIONSHIPS WITH OTHERS): 0 - NEVER BOTHERED
RATE HOW EMOTIONALLY BOTHERED YOU ARE BY YOUR SKIN PROBLEM (FOR EXAMPLE, WORRY, EMBARRASSMENT, FRUSTRATION): 0 - NEVER BOTHERED
RATE HOW BOTHERED YOU ARE BY EFFECTS OF YOUR SKIN PROBLEMS ON YOUR ACTIVITIES (EG, GOING OUT, ACCOMPLISHING WHAT YOU WANT, WORK ACTIVITIES OR YOUR RELATIONSHIPS WITH OTHERS): 0 - NEVER BOTHERED
RATE HOW BOTHERED YOU ARE BY SYMPTOMS OF YOUR SKIN PROBLEM (EG, ITCHING, STINGING BURNING, HURTING OR SKIN IRRITATION): 0 - NEVER BOTHERED

## 2025-01-08 ASSESSMENT — PATIENT GLOBAL ASSESSMENT (PGA)
WHAT IS THE PGA: PATIENT GLOBAL ASSESSMENT:  1 - CLEAR
PATIENT GLOBAL ASSESSMENT: PATIENT GLOBAL ASSESSMENT:  1 - CLEAR

## 2025-01-08 ASSESSMENT — ITCH NUMERIC RATING SCALE: HOW SEVERE IS YOUR ITCHING?: 0

## 2025-01-08 NOTE — PROGRESS NOTES
Subjective     Tara Clifford is a 74 y.o. female who presents for the following: Skin Check. Last derm visit 7/9/24 for Full Skin Exam. History of basal cell carcinoma. History of actinic keratoses.     Review of Systems:  No other skin or systemic complaints other than what is documented elsewhere in the note.    The following portions of the chart were reviewed this encounter and updated as appropriate:  Tobacco  Allergies  Meds  Problems  Med Hx  Surg Hx         Skin Cancer History  Biopsy Date Type Location Status   1/4/24 BCC Left Alar Crease Treatment Complete  7/9/24       Specialty Problems    None       Objective   Well appearing patient in no apparent distress; mood and affect are within normal limits.    A full examination was performed including scalp, head, eyes, ears, nose, lips, neck, chest, axillae, abdomen, back, buttocks, bilateral upper extremities, bilateral lower extremities, hands, feet, fingers, toes, fingernails, and toenails. All findings within normal limits unless otherwise noted below.    Assessment/Plan   1. Multiple benign nevi  Brown and tan macules and papules with reassuring findings on dermoscopy    -These lesions have benign, reassuring patterns on dermoscopy  -Recommend continued self observation, and to contact the office if any changes in nevi are noticed    2. Screening exam for skin cancer  As part of a routine Full Skin Exam, a genital examination with the presence of a chaperone was offered. The patient agreed to the exam and declined the chaperone.       Full body skin exam  -No lesions concerning for malignancy on the remainder the skin exam today   - The ugly duckling sign was discussed. Monitor for any skin lesions that are different in color, shape, or size than others on body  -Sun protection was discussed. Recommend SPF 30+, hats with brims, sun protective clothing, and avoiding sun exposure between 10 AM and 2 PM whenever possible  -Recommend regular skin exams  or sooner if new or changing lesions       Related Procedures  Follow Up In Dermatology - Established Patient  Follow Up In Dermatology - Established Patient    3. Excoriation  Mid Tip of Nose  Pinpoint excoriation, normal appearing vascular pattern. 1 cm anterior to Mohs surgery scar    New in the last few days per patient report.    Excoriation is favored but also consider early basal cell carcinoma     If still present after 1-2months, call for visit to re-eval and possibly biopsy    4. Lentigo  Tan macules    -Benign appearing on exam  -Reassurance, recommend observation    5. Seborrheic keratosis  Stuck on, waxy macule(s)/papule(s)/plaque(s) with comedo-like openings and milia like cysts    -Discussed the nature of the diagnosis  -Reassurance, recommend continued observation    6. Personal history of skin cancer    Personal History of Non-Melanoma Skin Cancer  -Well healed scar(s) with no evidence of recurrence  -Discussed the need for annual or semi-annual skin examinations and to return sooner if any new or changing lesions are noticed. Patient verbalizes understanding    7. Actinic skin damage  Background of photodamage with hyper- and hypo-pigmented macules on the skin    Actinic keratosis on right cheek treated with liquid nitrogen 7/9/24, resolved        Follow up 6 months Full Skin Exam

## 2025-02-06 ENCOUNTER — APPOINTMENT (OUTPATIENT)
Facility: CLINIC | Age: 75
End: 2025-02-06
Payer: MEDICARE

## 2025-02-06 VITALS — WEIGHT: 240 LBS | BODY MASS INDEX: 37.03 KG/M2 | HEART RATE: 84 BPM | OXYGEN SATURATION: 99 %

## 2025-02-06 DIAGNOSIS — R60.0 BILATERAL LOWER EXTREMITY EDEMA: Primary | ICD-10-CM

## 2025-02-06 DIAGNOSIS — I10 PRIMARY HYPERTENSION: ICD-10-CM

## 2025-02-06 DIAGNOSIS — Z12.31 ENCOUNTER FOR SCREENING MAMMOGRAM FOR MALIGNANT NEOPLASM OF BREAST: ICD-10-CM

## 2025-02-06 DIAGNOSIS — F41.9 ANXIETY: ICD-10-CM

## 2025-02-06 PROCEDURE — 1125F AMNT PAIN NOTED PAIN PRSNT: CPT | Performed by: INTERNAL MEDICINE

## 2025-02-06 PROCEDURE — 99214 OFFICE O/P EST MOD 30 MIN: CPT | Performed by: INTERNAL MEDICINE

## 2025-02-06 RX ORDER — OLMESARTAN MEDOXOMIL AND HYDROCHLOROTHIAZIDE 40/12.5 40; 12.5 MG/1; MG/1
1 TABLET ORAL DAILY
Qty: 90 TABLET | Refills: 0 | Status: SHIPPED | OUTPATIENT
Start: 2025-02-06 | End: 2025-05-07

## 2025-02-06 RX ORDER — VENLAFAXINE HYDROCHLORIDE 150 MG/1
150 CAPSULE, EXTENDED RELEASE ORAL DAILY
Qty: 90 CAPSULE | Refills: 3 | Status: SHIPPED | OUTPATIENT
Start: 2025-02-06 | End: 2026-02-06

## 2025-02-06 RX ORDER — VENLAFAXINE HYDROCHLORIDE 150 MG/1
150 CAPSULE, EXTENDED RELEASE ORAL DAILY
Qty: 90 CAPSULE | Refills: 3 | OUTPATIENT
Start: 2025-02-06 | End: 2026-02-06

## 2025-02-06 ASSESSMENT — PAIN SCALES - GENERAL: PAINLEVEL_OUTOF10: 2

## 2025-02-06 NOTE — PROGRESS NOTES
Subjective   Patient ID: Tara Clifford is a 74 y.o. female who presents for Follow-up (3 month follow-up).    EPOV:    HTN    Anxiety    HPI     Review of Systems      Constitutional: Negative for activity change, appetite change, chills, fatigue and fever.   HENT: Negative for congestion, ear pain, rhinorrhea, sinus pain and sore throat.   Eyes: Negative for pain, discharge and redness.   Respiratory: Negative for cough, shortness of breath and wheezing.   Cardiovascular: Negative for chest pain.   Gastrointestinal: Negative for abdominal pain.   Skin: Negative for rash.       Objective   Pulse 84   Wt 109 kg (240 lb)   SpO2 99%   BMI 37.03 kg/m²     Physical Exam      General: WDWN in NAD  HEENT : Mucous membranes are moist. PERRL. Sclera are without icterus.No adenopathy. JVP is not elevated.  Lungs : CTA bilateral. Breath sounds are equal bilaterally  Heart: regular rate & rhythm. No murmurs or extra heart sounds. No rub  Abdomen: soft, non-tender, no masses or organomegaly. Bowel sounds are present.  Extremities: No clubbing, cyanosis,  + edema  Neuro: A&O x3 . Normal Affect. Gait is normal.  Rheum: No joint swelling    Breast-No masses. L breast Iris tatoo    Assessment/Plan       L/E edema- D/t Amlodipine Rx    HTN--Goal < 130/80-  Encouraged sodium restriction, DASH or Mediterranean diet  - stop Amlodipine. Start Olmesartan/hydrochlorothiazide 40/12.5 Mg daily    Anxiety- Continue with Venlafaxine 150 mg daily    Recommend RSV Vax     Mammogram -    CRC-2021    Follow up 3 months/PRN

## 2025-04-08 ENCOUNTER — APPOINTMENT (OUTPATIENT)
Facility: CLINIC | Age: 75
End: 2025-04-08
Payer: MEDICARE

## 2025-04-08 VITALS
HEART RATE: 87 BPM | OXYGEN SATURATION: 97 % | SYSTOLIC BLOOD PRESSURE: 110 MMHG | BODY MASS INDEX: 35.77 KG/M2 | WEIGHT: 231.8 LBS | DIASTOLIC BLOOD PRESSURE: 78 MMHG

## 2025-04-08 DIAGNOSIS — R60.0 BILATERAL LOWER EXTREMITY EDEMA: Primary | ICD-10-CM

## 2025-04-08 DIAGNOSIS — Z00.00 ANNUAL PHYSICAL EXAM: ICD-10-CM

## 2025-04-08 DIAGNOSIS — I10 PRIMARY HYPERTENSION: ICD-10-CM

## 2025-04-08 PROCEDURE — 1126F AMNT PAIN NOTED NONE PRSNT: CPT | Performed by: INTERNAL MEDICINE

## 2025-04-08 PROCEDURE — 3074F SYST BP LT 130 MM HG: CPT | Performed by: INTERNAL MEDICINE

## 2025-04-08 PROCEDURE — 3078F DIAST BP <80 MM HG: CPT | Performed by: INTERNAL MEDICINE

## 2025-04-08 PROCEDURE — 99213 OFFICE O/P EST LOW 20 MIN: CPT | Performed by: INTERNAL MEDICINE

## 2025-04-08 PROCEDURE — 1159F MED LIST DOCD IN RCRD: CPT | Performed by: INTERNAL MEDICINE

## 2025-04-08 RX ORDER — OLMESARTAN MEDOXOMIL AND HYDROCHLOROTHIAZIDE 40/12.5 40; 12.5 MG/1; MG/1
1 TABLET ORAL DAILY
Qty: 90 TABLET | Refills: 3 | Status: SHIPPED | OUTPATIENT
Start: 2025-04-08 | End: 2026-04-08

## 2025-04-08 ASSESSMENT — PAIN SCALES - GENERAL: PAINLEVEL_OUTOF10: 0-NO PAIN

## 2025-04-08 NOTE — PROGRESS NOTES
Subjective   Patient ID: Tara Clifford is a 74 y.o. female who presents for Hypertension (8 week blood pressure check).    Follow up re HTN and L/E swelling    Amlodipine stopped and Olmesartan/hydrochlorothiazide 40/12.5 Mg daily     HPI     Review of Systems    L/E swelling has improved after stopping Amlodipine      Constitutional: Negative for activity change, appetite change, chills, fatigue and fever.   HENT: Negative for congestion, ear pain, rhinorrhea, sinus pain and sore throat.   Eyes: Negative for pain, discharge and redness.   Respiratory: Negative for cough, shortness of breath and wheezing.   Cardiovascular: Negative for chest pain.   Gastrointestinal: Negative for abdominal pain.   Skin: Negative for rash.       Objective   Pulse 87   Wt 105 kg (231 lb 12.8 oz)   SpO2 97%   BMI 35.77 kg/m²     Physical Exam      General: WDWN in NAD  HEENT : Mucous membranes are moist. PERRL. Sclera are without icterus.No adenopathy. JVP is not elevated.  Lungs : CTA bilateral. Breath sounds are equal bilaterally  Heart: regular rate & rhythm. No murmurs or extra heart sounds. No rub  Abdomen: soft, non-tender, no masses or organomegaly. Bowel sounds are present.  Extremities: No clubbing, cyanosis,   1+  edema  Neuro: A&O x3 . Normal Affect. Gait is normal.  Rheum: No joint swelling      Assessment/Plan        HTN--Goal < 130/80-  Encouraged sodium restriction, DASH or Mediterranean diet  -continue with Olmesartan/hydrochlorothiazide- 40/12.5 MG DAILY    L/E edema-Improved after stopping Amlodipine Rx    Anxiety-Continue with Venlafaxine 150 mg daily     Continue with current Rx    Follow up 3 months/PRN

## 2025-04-10 ENCOUNTER — APPOINTMENT (OUTPATIENT)
Facility: CLINIC | Age: 75
End: 2025-04-10
Payer: MEDICARE

## 2025-04-15 ENCOUNTER — HOSPITAL ENCOUNTER (OUTPATIENT)
Dept: RADIOLOGY | Facility: CLINIC | Age: 75
Discharge: HOME | End: 2025-04-15
Payer: MEDICARE

## 2025-04-15 VITALS — BODY MASS INDEX: 35.08 KG/M2 | HEIGHT: 68 IN | WEIGHT: 231.48 LBS

## 2025-04-15 DIAGNOSIS — Z12.31 ENCOUNTER FOR SCREENING MAMMOGRAM FOR MALIGNANT NEOPLASM OF BREAST: ICD-10-CM

## 2025-04-15 PROCEDURE — 77063 BREAST TOMOSYNTHESIS BI: CPT | Performed by: RADIOLOGY

## 2025-04-15 PROCEDURE — 77067 SCR MAMMO BI INCL CAD: CPT

## 2025-04-15 PROCEDURE — 77067 SCR MAMMO BI INCL CAD: CPT | Performed by: RADIOLOGY

## 2025-06-17 ENCOUNTER — APPOINTMENT (OUTPATIENT)
Dept: OPHTHALMOLOGY | Facility: CLINIC | Age: 75
End: 2025-06-17
Payer: MEDICARE

## 2025-07-03 ENCOUNTER — LAB (OUTPATIENT)
Dept: LAB | Facility: HOSPITAL | Age: 75
End: 2025-07-03
Payer: MEDICARE

## 2025-07-03 DIAGNOSIS — Z00.00 ENCOUNTER FOR GENERAL ADULT MEDICAL EXAMINATION WITHOUT ABNORMAL FINDINGS: Primary | ICD-10-CM

## 2025-07-03 LAB
25(OH)D3 SERPL-MCNC: 54 NG/ML (ref 30–100)
ALBUMIN SERPL BCP-MCNC: 4.2 G/DL (ref 3.4–5)
ALP SERPL-CCNC: 117 U/L (ref 33–136)
ALT SERPL W P-5'-P-CCNC: 16 U/L (ref 7–45)
ANION GAP SERPL CALC-SCNC: 15 MMOL/L (ref 10–20)
APPEARANCE UR: CLEAR
AST SERPL W P-5'-P-CCNC: 18 U/L (ref 9–39)
BASOPHILS # BLD AUTO: 0.04 X10*3/UL (ref 0–0.1)
BASOPHILS NFR BLD AUTO: 0.8 %
BILIRUB SERPL-MCNC: 0.4 MG/DL (ref 0–1.2)
BILIRUB UR STRIP.AUTO-MCNC: NEGATIVE MG/DL
BUN SERPL-MCNC: 17 MG/DL (ref 6–23)
CALCIUM SERPL-MCNC: 9.2 MG/DL (ref 8.6–10.3)
CHLORIDE SERPL-SCNC: 102 MMOL/L (ref 98–107)
CHOLEST SERPL-MCNC: 261 MG/DL (ref 0–199)
CHOLESTEROL/HDL RATIO: 4
CO2 SERPL-SCNC: 26 MMOL/L (ref 21–32)
COLOR UR: ABNORMAL
CREAT SERPL-MCNC: 0.72 MG/DL (ref 0.5–1.05)
CRP SERPL HS-MCNC: 6.4 MG/L
EGFRCR SERPLBLD CKD-EPI 2021: 87 ML/MIN/1.73M*2
EOSINOPHIL # BLD AUTO: 0.07 X10*3/UL (ref 0–0.4)
EOSINOPHIL NFR BLD AUTO: 1.5 %
ERYTHROCYTE [DISTWIDTH] IN BLOOD BY AUTOMATED COUNT: 13 % (ref 11.5–14.5)
EST. AVERAGE GLUCOSE BLD GHB EST-MCNC: 111 MG/DL
GLUCOSE SERPL-MCNC: 88 MG/DL (ref 74–99)
GLUCOSE UR STRIP.AUTO-MCNC: NORMAL MG/DL
HBA1C MFR BLD: 5.5 % (ref ?–5.7)
HCT VFR BLD AUTO: 44.4 % (ref 36–46)
HDLC SERPL-MCNC: 65.4 MG/DL
HGB BLD-MCNC: 14.1 G/DL (ref 12–16)
IMM GRANULOCYTES # BLD AUTO: 0.01 X10*3/UL (ref 0–0.5)
IMM GRANULOCYTES NFR BLD AUTO: 0.2 % (ref 0–0.9)
KETONES UR STRIP.AUTO-MCNC: NEGATIVE MG/DL
LDLC SERPL CALC-MCNC: 153 MG/DL
LEUKOCYTE ESTERASE UR QL STRIP.AUTO: ABNORMAL
LYMPHOCYTES # BLD AUTO: 0.78 X10*3/UL (ref 0.8–3)
LYMPHOCYTES NFR BLD AUTO: 16.4 %
MCH RBC QN AUTO: 28.3 PG (ref 26–34)
MCHC RBC AUTO-ENTMCNC: 31.8 G/DL (ref 32–36)
MCV RBC AUTO: 89 FL (ref 80–100)
MONOCYTES # BLD AUTO: 0.36 X10*3/UL (ref 0.05–0.8)
MONOCYTES NFR BLD AUTO: 7.5 %
NEUTROPHILS # BLD AUTO: 3.51 X10*3/UL (ref 1.6–5.5)
NEUTROPHILS NFR BLD AUTO: 73.6 %
NITRITE UR QL STRIP.AUTO: NEGATIVE
NON HDL CHOLESTEROL: 196 MG/DL (ref 0–149)
NRBC BLD-RTO: 0 /100 WBCS (ref 0–0)
PH UR STRIP.AUTO: 7 [PH]
PLATELET # BLD AUTO: 244 X10*3/UL (ref 150–450)
POTASSIUM SERPL-SCNC: 4.8 MMOL/L (ref 3.5–5.3)
PROT SERPL-MCNC: 6.2 G/DL (ref 6.4–8.2)
PROT UR STRIP.AUTO-MCNC: NEGATIVE MG/DL
RBC # BLD AUTO: 4.99 X10*6/UL (ref 4–5.2)
RBC # UR STRIP.AUTO: NEGATIVE MG/DL
RBC #/AREA URNS AUTO: ABNORMAL /HPF
SODIUM SERPL-SCNC: 138 MMOL/L (ref 136–145)
SP GR UR STRIP.AUTO: 1.01
SQUAMOUS #/AREA URNS AUTO: ABNORMAL /HPF
TRIGL SERPL-MCNC: 214 MG/DL (ref 0–149)
TSH SERPL-ACNC: 2.19 MIU/L (ref 0.44–3.98)
UROBILINOGEN UR STRIP.AUTO-MCNC: NORMAL MG/DL
VLDL: 43 MG/DL (ref 0–40)
WBC # BLD AUTO: 4.8 X10*3/UL (ref 4.4–11.3)
WBC #/AREA URNS AUTO: ABNORMAL /HPF

## 2025-07-03 PROCEDURE — 85025 COMPLETE CBC W/AUTO DIFF WBC: CPT

## 2025-07-03 PROCEDURE — 80053 COMPREHEN METABOLIC PANEL: CPT

## 2025-07-03 PROCEDURE — 81001 URINALYSIS AUTO W/SCOPE: CPT

## 2025-07-03 PROCEDURE — 84443 ASSAY THYROID STIM HORMONE: CPT

## 2025-07-03 PROCEDURE — 80061 LIPID PANEL: CPT

## 2025-07-03 PROCEDURE — 36415 COLL VENOUS BLD VENIPUNCTURE: CPT

## 2025-07-08 ENCOUNTER — APPOINTMENT (OUTPATIENT)
Facility: CLINIC | Age: 75
End: 2025-07-08
Payer: MEDICARE

## 2025-07-08 ENCOUNTER — OFFICE VISIT (OUTPATIENT)
Facility: CLINIC | Age: 75
End: 2025-07-08
Payer: MEDICARE

## 2025-07-08 VITALS — HEIGHT: 67 IN | BODY MASS INDEX: 38.24 KG/M2 | WEIGHT: 243.61 LBS

## 2025-07-08 VITALS — HEIGHT: 67 IN | WEIGHT: 243.61 LBS | BODY MASS INDEX: 38.24 KG/M2 | HEART RATE: 89 BPM | OXYGEN SATURATION: 95 %

## 2025-07-08 DIAGNOSIS — E78.9 LIPID DISORDER: ICD-10-CM

## 2025-07-08 DIAGNOSIS — Z78.0 ASYMPTOMATIC MENOPAUSE: ICD-10-CM

## 2025-07-08 DIAGNOSIS — E66.01 OBESITY, MORBID (MULTI): ICD-10-CM

## 2025-07-08 DIAGNOSIS — Z00.00 ROUTINE GENERAL MEDICAL EXAMINATION AT A HEALTH CARE FACILITY: Primary | ICD-10-CM

## 2025-07-08 DIAGNOSIS — M81.0 AGE-RELATED OSTEOPOROSIS WITHOUT CURRENT PATHOLOGICAL FRACTURE: ICD-10-CM

## 2025-07-08 DIAGNOSIS — M85.80 OSTEOPENIA, UNSPECIFIED LOCATION: ICD-10-CM

## 2025-07-08 ASSESSMENT — ENCOUNTER SYMPTOMS
APPETITE CHANGE: 0
PALPITATIONS: 0
CONFUSION: 0
HEADACHES: 0
NERVOUS/ANXIOUS: 0
BACK PAIN: 0
FACIAL SWELLING: 0
FEVER: 0
APNEA: 0
SINUS PRESSURE: 0
HEMATURIA: 0
SLEEP DISTURBANCE: 0
AGITATION: 0
DIZZINESS: 0
BLOOD IN STOOL: 0
FREQUENCY: 0
ARTHRALGIAS: 1
JOINT SWELLING: 0
DYSURIA: 0
WHEEZING: 0
SHORTNESS OF BREATH: 0
MYALGIAS: 0
CHEST TIGHTNESS: 0
NAUSEA: 0
OCCASIONAL FEELINGS OF UNSTEADINESS: 0
EYE REDNESS: 0
ADENOPATHY: 0
NUMBNESS: 0
TREMORS: 0
COUGH: 0
EYE PAIN: 0
ABDOMINAL PAIN: 0
FLANK PAIN: 0
POLYDIPSIA: 0
SORE THROAT: 0
DIARRHEA: 0
LOSS OF SENSATION IN FEET: 0
VOMITING: 0
FATIGUE: 0
CONSTIPATION: 0
BRUISES/BLEEDS EASILY: 0
DEPRESSION: 0

## 2025-07-08 ASSESSMENT — PAIN SCALES - GENERAL
PAINLEVEL_OUTOF10: 0-NO PAIN
PAINLEVEL_OUTOF10: 0-NO PAIN

## 2025-07-08 ASSESSMENT — ACTIVITIES OF DAILY LIVING (ADL)
MANAGING_FINANCES: INDEPENDENT
TAKING_MEDICATION: INDEPENDENT
DRESSING: INDEPENDENT
BATHING: INDEPENDENT
DOING_HOUSEWORK: INDEPENDENT
GROCERY_SHOPPING: INDEPENDENT

## 2025-07-08 NOTE — PROGRESS NOTES
"Subjective   Reason for Visit: Tara Clifford is an 75 y.o. female here for a Medicare Wellness visit.     AWV    HTN    ATS    Hyperlipidemia            HPI    Fall last week-     Patient Care Team:  Benson Arcos MD as PCP - General  Benson Arcos MD as PCP - [employer] PCP     Review of Systems   Constitutional:  Negative for appetite change, fatigue and fever.   HENT:  Negative for congestion, ear discharge, ear pain, facial swelling, mouth sores, sinus pressure and sore throat.    Eyes:  Negative for pain, redness and visual disturbance.   Respiratory:  Negative for apnea, cough, chest tightness, shortness of breath and wheezing.    Cardiovascular:  Negative for chest pain, palpitations and leg swelling.   Gastrointestinal:  Negative for abdominal pain, blood in stool, constipation, diarrhea, nausea and vomiting.   Endocrine: Negative for polydipsia and polyuria.   Genitourinary:  Negative for dysuria, flank pain, frequency, hematuria and urgency.   Musculoskeletal:  Positive for arthralgias. Negative for back pain, gait problem, joint swelling and myalgias.        Knee /ankle/lower back pain   Skin:  Negative for pallor and rash.   Neurological:  Negative for dizziness, tremors, numbness and headaches.   Hematological:  Negative for adenopathy. Does not bruise/bleed easily.   Psychiatric/Behavioral:  Negative for agitation, confusion and sleep disturbance. The patient is not nervous/anxious.            Objective   Vitals:  Pulse 89   Ht 1.705 m (5' 7.13\")   Wt 110 kg (243 lb 9.7 oz)   SpO2 95%   BMI 38.01 kg/m²       120/78    Physical Exam  Constitutional:       General: She is not in acute distress.     Appearance: Normal appearance. She is not diaphoretic.   HENT:      Head: Normocephalic and atraumatic.      Right Ear: Tympanic membrane, ear canal and external ear normal.      Left Ear: Tympanic membrane, ear canal and external ear normal.      Nose: No congestion or rhinorrhea.     "  Mouth/Throat:      Mouth: Mucous membranes are moist.      Pharynx: Oropharynx is clear. No oropharyngeal exudate or posterior oropharyngeal erythema.   Eyes:      Extraocular Movements: Extraocular movements intact.      Conjunctiva/sclera: Conjunctivae normal.      Pupils: Pupils are equal, round, and reactive to light.   Neck:      Vascular: No carotid bruit.   Cardiovascular:      Rate and Rhythm: Normal rate and regular rhythm.      Pulses: Normal pulses.      Heart sounds: Normal heart sounds. No murmur heard.     No friction rub.   Pulmonary:      Effort: No respiratory distress.      Breath sounds: No wheezing or rales.   Chest:      Chest wall: No tenderness.   Abdominal:      General: Abdomen is flat. Bowel sounds are normal. There is no distension.      Palpations: Abdomen is soft. There is no mass.      Tenderness: There is no abdominal tenderness. There is no guarding or rebound.      Hernia: No hernia is present.   Musculoskeletal:         General: No swelling, tenderness or deformity.      Cervical back: Normal range of motion and neck supple. No rigidity or tenderness.   Lymphadenopathy:      Cervical: No cervical adenopathy.   Skin:     General: Skin is warm.      Coloration: Skin is not jaundiced or pale.      Findings: No bruising, erythema, lesion or rash.   Neurological:      General: No focal deficit present.      Mental Status: She is alert and oriented to person, place, and time.      Motor: No weakness.      Coordination: Coordination normal.      Gait: Gait normal.      Deep Tendon Reflexes: Reflexes normal.   Psychiatric:         Mood and Affect: Mood normal.         Behavior: Behavior normal.             Assessment & Plan  Asymptomatic menopause         Osteopenia, unspecified location    Orders:    XR DEXA bone density; Future    cholecalciferol (Vitamin D-3) 25 mcg (1,000 units) capsule; Take 1 capsule (25 mcg) by mouth once daily.    Age-related osteoporosis without current  pathological fracture    Orders:    XR DEXA bone density; Future    Lipid disorder    Orders:    Lipid Panel; Future    Aspartate Aminotransferase; Future    Alanine Aminotransferase; Future    Routine general medical examination at a health care facility              HTN- -Goal < 130/80-  Encouraged sodium restriction, DASH or Mediterranean diet  - continue with Benicar/hydrochlorothiazide - 40/12.5 Mg + Amlodipine 5 mg daily    ATS-continue with Effexor 150 mg daily    Hyperlipidemia-start Atorvastatin 20 mg daily    Elevated HS-CRP    Continue with current Rx     Follow up in 6 months/PRN     Patient was identified as a fall risk. Risk prevention instructions provided.

## 2025-07-09 ENCOUNTER — TELEPHONE (OUTPATIENT)
Dept: PRIMARY CARE | Facility: CLINIC | Age: 75
End: 2025-07-09
Payer: MEDICARE

## 2025-07-09 DIAGNOSIS — E78.9 LIPID DISORDER: Primary | ICD-10-CM

## 2025-07-09 DIAGNOSIS — E78.5 DYSLIPIDEMIA: Primary | ICD-10-CM

## 2025-07-09 RX ORDER — ATORVASTATIN CALCIUM 20 MG/1
20 TABLET, FILM COATED ORAL DAILY
Qty: 100 TABLET | Refills: 3 | Status: SHIPPED | OUTPATIENT
Start: 2025-07-09 | End: 2026-08-13

## 2025-07-09 NOTE — TELEPHONE ENCOUNTER
Pt is calling in regards to speaking with you yesterday at visit about cholesterol medication and it is not at [facility] pharmacy - [facility]

## 2025-07-11 RX ORDER — VIT C/E/ZN/COPPR/LUTEIN/ZEAXAN 250MG-90MG
25 CAPSULE ORAL DAILY
Qty: 30 CAPSULE | Refills: 11 | Status: SHIPPED | OUTPATIENT
Start: 2025-07-11 | End: 2026-07-11

## 2025-07-11 ASSESSMENT — ENCOUNTER SYMPTOMS
BLOOD IN STOOL: 0
WHEEZING: 0
MYALGIAS: 0
BRUISES/BLEEDS EASILY: 0
NUMBNESS: 0
ABDOMINAL PAIN: 0
HEMATURIA: 0
SHORTNESS OF BREATH: 0
DIZZINESS: 0
EYE PAIN: 0
PALPITATIONS: 0
BACK PAIN: 0
POLYDIPSIA: 0
AGITATION: 0
NERVOUS/ANXIOUS: 0
COUGH: 0
CHEST TIGHTNESS: 0
HEADACHES: 0
APPETITE CHANGE: 0
SLEEP DISTURBANCE: 0
NAUSEA: 0
DYSURIA: 0
JOINT SWELLING: 0
ARTHRALGIAS: 0
VOMITING: 0
FEVER: 0
FATIGUE: 0
ADENOPATHY: 0
DIARRHEA: 0
SORE THROAT: 0
TREMORS: 0
CONSTIPATION: 0
EYE REDNESS: 0
SINUS PRESSURE: 0
FACIAL SWELLING: 0
FLANK PAIN: 0
APNEA: 0
CONFUSION: 0
FREQUENCY: 0

## 2025-07-11 NOTE — PATIENT INSTRUCTIONS

## 2025-07-11 NOTE — PROGRESS NOTES
"Physical Exam    Name Tara Clifford    Date of Service :7/11/2025      Tara Clifford  75 y.o. is here for an annual physical exam    Past Medical History:   Diagnosis Date    Arthritis 01/01/2000    Basal cell carcinoma (BCC)     Cataract     Dry eyes     Femoral acetabular impingement     Femoral acetabular impingement     Hypertension 01/01/2023    Osteoarthritis     PVD (posterior vitreous detachment), both eyes        Review of Systems   Constitutional:  Negative for appetite change, fatigue and fever.   HENT:  Negative for congestion, ear discharge, ear pain, facial swelling, mouth sores, sinus pressure and sore throat.    Eyes:  Negative for pain, redness and visual disturbance.   Respiratory:  Negative for apnea, cough, chest tightness, shortness of breath and wheezing.    Cardiovascular:  Negative for chest pain, palpitations and leg swelling.   Gastrointestinal:  Negative for abdominal pain, blood in stool, constipation, diarrhea, nausea and vomiting.   Endocrine: Negative for polydipsia and polyuria.   Genitourinary:  Negative for dysuria, flank pain, frequency, hematuria and urgency.   Musculoskeletal:  Negative for arthralgias, back pain, gait problem, joint swelling and myalgias.   Skin:  Negative for pallor and rash.   Neurological:  Negative for dizziness, tremors, numbness and headaches.   Hematological:  Negative for adenopathy. Does not bruise/bleed easily.   Psychiatric/Behavioral:  Negative for agitation, confusion and sleep disturbance. The patient is not nervous/anxious.            Surgical History[1]     Social History[2]     Social History     Social History Narrative    Not on file       Family History[3]     Ht 1.705 m (5' 7.13\")   Wt 110 kg (243 lb 9.7 oz)   BMI 38.01 kg/m²     Physical Exam  Constitutional:       General: She is not in acute distress.     Appearance: Normal appearance. She is not diaphoretic.   HENT:      Head: Normocephalic and atraumatic.      Right Ear: Tympanic " membrane, ear canal and external ear normal.      Left Ear: Tympanic membrane, ear canal and external ear normal.      Nose: No congestion or rhinorrhea.      Mouth/Throat:      Mouth: Mucous membranes are moist.      Pharynx: Oropharynx is clear. No oropharyngeal exudate or posterior oropharyngeal erythema.   Eyes:      Extraocular Movements: Extraocular movements intact.      Conjunctiva/sclera: Conjunctivae normal.      Pupils: Pupils are equal, round, and reactive to light.   Neck:      Vascular: No carotid bruit.   Cardiovascular:      Rate and Rhythm: Normal rate and regular rhythm.      Pulses: Normal pulses.      Heart sounds: Normal heart sounds. No murmur heard.     No friction rub.   Pulmonary:      Effort: No respiratory distress.      Breath sounds: No wheezing or rales.   Chest:      Chest wall: No tenderness.   Abdominal:      General: Abdomen is flat. Bowel sounds are normal. There is no distension.      Palpations: Abdomen is soft. There is no mass.      Tenderness: There is no abdominal tenderness. There is no guarding or rebound.      Hernia: No hernia is present.   Musculoskeletal:         General: No swelling, tenderness or deformity.      Cervical back: Normal range of motion and neck supple. No rigidity or tenderness.   Lymphadenopathy:      Cervical: No cervical adenopathy.   Skin:     General: Skin is warm.      Coloration: Skin is not jaundiced or pale.      Findings: No bruising, erythema, lesion or rash.   Neurological:      General: No focal deficit present.      Mental Status: She is alert and oriented to person, place, and time.      Motor: No weakness.      Coordination: Coordination normal.      Gait: Gait normal.      Deep Tendon Reflexes: Reflexes normal.   Psychiatric:         Mood and Affect: Mood normal.         Behavior: Behavior normal.               Problem List Items Addressed This Visit    None      Assessment/Plan          HTN- -Goal < 130/80-  Encouraged sodium  restriction, DASH or Mediterranean diet  - continue with Benicar/hydrochlorothiazide - 40/12.5 Mg + Amlodipine 5 mg daily    ATS-continue with Effexor 150 mg daily    Hyperlipidemia-start Atorvastatin 20 mg daily    Elevated HS-CRP    Continue with current Rx     Follow up in 6 months/PRN     Patient was identified as a fall risk. Risk prevention instructions provided.         [1]   Past Surgical History:  Procedure Laterality Date    CAPSULOTOMY Bilateral 2010    CATARACT EXTRACTION W/  INTRAOCULAR LENS IMPLANT Bilateral 2008    HERNIA REPAIR  2013    HYSTERECTOMY  2006    OOPHORECTOMY  2006    OTHER SURGICAL HISTORY  12/07/2020    Inguinal hernia repair    SKIN BIOPSY  01/06/2024   [2]   Social History  Tobacco Use    Smoking status: Never    Smokeless tobacco: Never   Vaping Use    Vaping status: Never Used   Substance Use Topics    Alcohol use: Not Currently    Drug use: Never   [3]   Family History  Problem Relation Name Age of Onset    Cancer Mother Omaira Ramirez (Mother)     Breast cancer Mother Omaira Ramirez (Mother)     Diabetes Mother Omaira Ramirez (Mother)     Cancer Father Xavier Ramirez (Father)     Breast cancer Maternal Grandmother Cyndy Liu     Cancer Brother Xavier Ramirez     Cancer Brother Xavier Ramirez       failure to thrive/medical evaluation

## 2025-07-22 ENCOUNTER — TELEPHONE (OUTPATIENT)
Dept: DERMATOLOGY | Facility: CLINIC | Age: 75
End: 2025-07-22
Payer: MEDICARE

## 2025-07-29 ENCOUNTER — APPOINTMENT (OUTPATIENT)
Dept: DERMATOLOGY | Facility: CLINIC | Age: 75
End: 2025-07-29
Payer: MEDICARE

## 2025-08-04 NOTE — PROGRESS NOTES
Cristine Perez MD   Adult Reconstruction and Joint Replacement Surgery  Phone: [phone]     Fax: [phone]       Name: Tara Clifford  Age: 75 y.o.   : 1950   Date of Visit: 2025    Follow-up Knee    CC: Follow-up RIGHT knee     History of Present Illness:  This patient presents with several years of RIGHT knee pain.     They were last seen for this problem on . At the last visit, the patient underwent steroid injection of the right knee. The patient reports 80% relief for at least 6 months.  She has been taking Tylenol as needed with good relief.  She occasionally takes ibuprofen.  She has been involved in physical therapy which is helpful.  She is hoping for repeat steroid injection today.    Patient has tried the following Ice, Tylenol (arthritis dosing) , Activity modification, Physical therapy, Hyaluronic acid injections, and Xray. Date of last steroid injection: 10/11/2024, right knee. Patient does have pain at night. Patient does not report falls related to this problem. Patient is able to walk 2-3 blocks. Patient is currently using nothing as assistive device. Primarily complains of groin, buttock, and lateral hip pain. The right KNEE pain occurs anterior. Patient has difficulty with donning and doffing shoes and socks, stairs, and getting in/out of car . The pain is significantly impacting their ability to perform activities of daily living. Patient reports no longer able to do activities such as walking in park.      Focused History  PMH: Reviewed and PE/DVT: no  PSH: Reviewed , Hip/Knee replacement: no, Hip/Knee surgery: no, Anesthesia complications: no, Spine surgery: no, Surgical infection: no, and Weight loss surgery: no  Meds: Reviewed, Current Anticoagulants: no, Weight loss medication: no, and Current Opioids: no  Allergies: Reviewed  and The patient reports no contraindications or allergies to cephalosporins, aspirin, NSAIDs or opioids, except as noted above.  FH: No  family history of any bleeding or clotting disorders.  SHx: Reviewed, Occupation: retired, Current smoker: no, EtOH intake weekly: wine, Social support: unk, and Preferred physical activities: swim, pool  Dental Hx: Last routine cleaning: 10/10/24 and Discussed that all invasive dental work must be completed at least 3 months prior to joint replacement surgery. Patient understands they are to avoid any invasive dental work 3-6 months post-surgically.   Episcopal: no    HISTORY  PROMs   No questionnaires on file.     Medical History[1]    Medical History[2]  Documented in chart and reviewed.     Surgical History[3]    Allergies: She is allergic to morphine and adhesive tape-silicones.     Medications:  Current Outpatient Medications   Medication Instructions    acetaminophen (TYLENOL 8 HOUR) 650 mg, 2 times daily    amLODIPine (NORVASC) 5 mg, oral, 2 times daily    ascorbic acid (Vitamin C) 1,000 mg tablet Every 24 hours    atorvastatin (LIPITOR) 20 mg, oral, Daily    cholecalciferol (VITAMIN D-3) 25 mcg, oral, Daily    diphenhydrAMINE-acetaminophen (Tylenol PM Extra Strength)  mg per tablet Every 24 hours    glucosamine HCl 1,500 mg tablet 1 tablet, Daily RT    Lactobacillus acidophilus 10 billion cell capsule 1 capsule, Daily RT    magnesium glycinate 300 mg, oral, Daily    multivit-min-folic acid-vit K (Multi For Her 50 Plus) 400-80 mcg capsule as directed Orally    olmesartan-hydrochlorothiazide (BENIcar HCT) 40-12.5 mg tablet 1 tablet, oral, Daily    saccharomyces boulardii (Florastor) 250 mg capsule 1 capsule, Every 12 hours    TURMERIC ORAL Take by mouth.    venlafaxine XR (EFFEXOR-XR) 150 mg, oral, Daily       Family History[4]  Documented in chart and reviewed.     Social History     Tobacco Use    Smoking status: Never    Smokeless tobacco: Never   Substance Use Topics    Alcohol use: Not Currently        Review of Systems: Review of systems completed with medical assistant intake. Please  refer to this note.     Physical Exam:  BMI: 38.    General: The patient is well appearing and has an appropriate affect.      Neurological Examination: SILT in SPN/DPN/Sural/Saphenous/Tibial nerves. 5/5 EHL, FHL, Tibial anterior, Gastrocnemius. Coordination grossly intact.      Cardiovascular Exam: Capillary refill <2 seconds. No edema. No varicose veins.      Lymphatic Examination: There is no obvious lymphatic swelling present around the involved joint.     Skin Exam: Skin around the pertinent joint is without evidence of infection or rash.     Gait: The patient ambulates with a coxalgic gait.      Lumbar spine:     No tenderness to palpation midline.     Negative straight leg raise bilaterally.     Right Hip Examination:  Gait: Coxalgic gait.     Examination of the hip reveals the skin to be intact.     There is mild tenderness over the greater trochanter.     There is no obvious swelling.     There is a positive Stinchfield test.     Range of motion is: full extension to 95 degrees of flexion.     The hip internally rotates to 15 degrees and externally rotates to 40 degrees.     Abduction is 40 degrees and adduction is 20 degrees.     There is groin and buttock pain with hip motion.     There is a negative straight leg raise.     Abductor strength 4/5.     Left Hip Examination:  Examination of the hip reveals the skin to be intact.     There is no tenderness over the greater trochanter.     There is no obvious swelling.     There is a negative Stinchfield test.     Range of motion is full extension to 95 degrees of flexion.     The hip internally rotates to 20 and externally rotates 40 degrees.     Abduction is 50 degrees and adduction is 20 degrees.     There is no groin and buttock pain with hip motion.     There is a negative straight leg raise.     Abductor strength 4+/5.     Right Knee Examination:  Examination of the right knee reveals the skin to be intact. There is no obvious swelling.     There is no  tenderness to palpation.     Range of motion is full extension to 120 degrees of flexion.     The knee is stable.     There is no grinding with range of motion.     There is no patellofemoral crepitus.     Left Knee Examination:  Examination of the left knee reveals the skin to be intact. There is no obvious swelling.     There is no tenderness to palpation.     Range of motion is full extension to 120 degrees of flexion.     The knee is stable.     There is no grinding with range of motion.     There is no patellofemoral crepitus.    Imaging:    Radiographs were personally reviewed today. There is evidence of moderate RIGHT  knee osteoarthritis with near LATERAL  bone on bone apposition.    Impression and Plan:  This patient is here for follow-up evaluation of RIGHT knee.    DIAGNOSIS  Primary osteoarthritis of right knee     I have discussed the options in detail with the patient. We have discussed anti-inflammatory medication, activity modification, physical therapy, corticosteroid injections, viscosupplementation injections, partial knee replacement surgery and total knee replacement surgery. Patient is responding well to nonsurgical treatment measures.    The risks and benefits of all these treatment options have been discussed in detail.     The patient has tried at least 3 months of the above conservative treatments and continues to have disabling pain, impaired activities of daily living and worsened quality of life.  Reviewed the surgical optimization steps to optimize their chances for a successful joint replacement surgery.      Currently their BMI is 38.  Discussed that obesity is a risk factor for continued progression of osteoarthritis. Each pound of weight loss offloads their hip and knee joints by 3-6 pounds.  The most effective of these options is weight loss mainly through restricting caloric intake.     A physical therapy prescription was ordered for the patient.  Patient will continue their home  exercise program. Strategies for pain management using over-the-counter anti-inflammatory medications reviewed.  The patient elects for a steroid injection, which was provided according to procedure note below. Discussed utility of brace. Will defer brace at this time.. Encouraged them to maintain range of motion and strength around the knee joints.  They will continue to implement these strategies in addressing their pain.      Recommend the patient continue optimizing nonsurgical treatment interventions as outlined above for management of their knee arthritis.  I would be happy to see them again at any point to discuss surgery if indicated or they are more optimized or to review progress with nonsurgical treatment of arthritis. The patient verbalizes understanding with the recommendations and treatment plan as outlined above and is in agreement.  Questions were addressed.    RTC: as needed    X-rays at next visit: as needed    Large Joint Injection [id number]: Knee  Consent given by: Patient  Timeout: Immediately prior to procedure the correct patient, procedure, and site was verified. Sterile gloves and semi-sterile technique were used.   Indications: Knee pain and joint swelling.   Location: RIGHT knee  Needle size: 22 G  Approach: Lateral    Medications administered: Please refer to medical assistant note for lot number and expiration date.   Subcutaneous   4 ml of 1% lidocaine     Deep   4 ml of 1% lidocaine   4 mL 0.5% marcaine   1 mL of 40 mg kenalog     Patient tolerance: Dressing applied. Patient tolerated the procedure well with no immediate complications.    L Inj/Asp: R knee on 8/8/2025 6:24 PM  Indications: pain and joint swelling  Details: 22 G needle, lateral approach  Medications: 40 mg triamcinolone acetonide 40 mg/mL; 8 mL lidocaine 10 mg/mL (1 %); 4 mL BUPivacaine HCl 0.5 % (5 mg/mL)  Outcome: tolerated well, no immediate complications  Procedure, treatment alternatives, risks and benefits explained,  specific risks discussed. Consent was given by the patient. Immediately prior to procedure a time out was called to verify the correct patient, procedure, equipment, support staff and site/side marked as required. Patient was prepped and draped in the usual sterile fashion.             _____________________  Cristine Perez MD   Attending Orthopaedic Surgeon  [facility]    [facility]    This office note was transcribed with dictation software.  Please excuse any typographical errors, program misunderstandings leading to inadvertent insertions or deletions of inappropriate wording, pronoun errors and other unintentional transcription errors not noticed on proof-reading.      _____________  Cristine Perez MD   Attending Orthopaedic Surgeon  [facility]    [facility]       This office note was transcribed with dictation software.  Please excuse any typographical errors, program misunderstandings leading to inadvertent insertions or deletions of inappropriate wording, pronoun errors and other unintentional transcription errors not noticed on proof-reading.                                    [1]   Past Medical History:  Diagnosis Date    Arthritis 01/01/2000    Basal cell carcinoma (BCC)     Cataract     Dry eyes     Femoral acetabular impingement     Femoral acetabular impingement     Hypertension 01/01/2023    Osteoarthritis     PVD (posterior vitreous detachment), both eyes    [2]   Past Medical History:  Diagnosis Date    Arthritis 01/01/2000    Basal cell carcinoma (BCC)     Cataract     Dry eyes     Femoral acetabular impingement     Femoral acetabular impingement     Hypertension 01/01/2023    Osteoarthritis     PVD (posterior vitreous detachment), both eyes    [3]   Past Surgical History:  Procedure Laterality Date    CAPSULOTOMY Bilateral 2010    CATARACT EXTRACTION W/  INTRAOCULAR LENS IMPLANT Bilateral  2008    HERNIA REPAIR  2013    HYSTERECTOMY  2006    OOPHORECTOMY  2006    OTHER SURGICAL HISTORY  12/07/2020    Inguinal hernia repair    SKIN BIOPSY  01/06/2024   [4]   Family History  Problem Relation Name Age of Onset    Cancer Mother Omaira Ashley (Mother)     Breast cancer Mother Omaira Ashley (Mother)     Diabetes Mother Omaira Ramirez (Mother)     Cancer Father Xavier Ramirez (Father)     Breast cancer Maternal Grandmother Cyndy Liu     Cancer Brother Xavier Ramirez     Cancer Brother Xavier Ramirez

## 2025-08-08 ENCOUNTER — OFFICE VISIT (OUTPATIENT)
Dept: ORTHOPEDIC SURGERY | Facility: CLINIC | Age: 75
End: 2025-08-08
Payer: MEDICARE

## 2025-08-08 DIAGNOSIS — E78.9 LIPID DISORDER: ICD-10-CM

## 2025-08-08 DIAGNOSIS — M17.11 PRIMARY OSTEOARTHRITIS OF RIGHT KNEE: Primary | ICD-10-CM

## 2025-08-08 PROCEDURE — 1125F AMNT PAIN NOTED PAIN PRSNT: CPT | Performed by: STUDENT IN AN ORGANIZED HEALTH CARE EDUCATION/TRAINING PROGRAM

## 2025-08-08 PROCEDURE — 99214 OFFICE O/P EST MOD 30 MIN: CPT | Performed by: STUDENT IN AN ORGANIZED HEALTH CARE EDUCATION/TRAINING PROGRAM

## 2025-08-08 PROCEDURE — 20610 DRAIN/INJ JOINT/BURSA W/O US: CPT | Performed by: STUDENT IN AN ORGANIZED HEALTH CARE EDUCATION/TRAINING PROGRAM

## 2025-08-08 PROCEDURE — 1159F MED LIST DOCD IN RCRD: CPT | Performed by: STUDENT IN AN ORGANIZED HEALTH CARE EDUCATION/TRAINING PROGRAM

## 2025-08-08 RX ADMIN — LIDOCAINE HYDROCHLORIDE 8 ML: 10 INJECTION, SOLUTION INFILTRATION; PERINEURAL at 18:24

## 2025-08-08 RX ADMIN — BUPIVACAINE HYDROCHLORIDE 4 ML: 5 INJECTION, SOLUTION PERINEURAL at 18:24

## 2025-08-08 RX ADMIN — TRIAMCINOLONE ACETONIDE 40 MG: 40 INJECTION, SUSPENSION INTRA-ARTICULAR; INTRAMUSCULAR at 18:24

## 2025-08-08 ASSESSMENT — PAIN - FUNCTIONAL ASSESSMENT: PAIN_FUNCTIONAL_ASSESSMENT: 0-10

## 2025-08-08 ASSESSMENT — PAIN SCALES - GENERAL: PAINLEVEL_OUTOF10: 4

## 2025-08-08 ASSESSMENT — PAIN DESCRIPTION - DESCRIPTORS: DESCRIPTORS: ACHING

## 2025-08-08 NOTE — LETTER
August 10, 2025     Benson Arcos MD  [address] [place] [address] Dr Beronica Tilleyon, [facility] [address]  [facility] [address]    Patient: Tara Clifford   YOB: 1950   Date of Visit: 2025       Dear Dr. Benson Arcos MD:    Thank you for referring Tara Clifford to me for evaluation. Below are my notes for this consultation.  If you have questions, please do not hesitate to call me. I look forward to following your patient along with you.       Sincerely,     Cristine Perez MD      CC: No Recipients  ______________________________________________________________________________________     Cristine Perez MD   Adult Reconstruction and Joint Replacement Surgery  Phone: [phone]     Fax: [phone]       Name: Tara Clifford  Age: 75 y.o.   : 1950   Date of Visit: 2025    Follow-up Knee    CC: Follow-up RIGHT knee     History of Present Illness:  This patient presents with several years of RIGHT knee pain.     They were last seen for this problem on . At the last visit, the patient underwent steroid injection of the right knee. The patient reports 80% relief for at least 6 months.  She has been taking Tylenol as needed with good relief.  She occasionally takes ibuprofen.  She has been involved in physical therapy which is helpful.  She is hoping for repeat steroid injection today.    Patient has tried the following Ice, Tylenol (arthritis dosing) , Activity modification, Physical therapy, Hyaluronic acid injections, and Xray. Date of last steroid injection: 10/11/2024, right knee. Patient does have pain at night. Patient does not report falls related to this problem. Patient is able to walk 2-3 blocks. Patient is currently using nothing as assistive device. Primarily complains of groin, buttock, and lateral hip pain. The right KNEE pain occurs anterior. Patient has difficulty with donning and doffing shoes and socks, stairs, and getting in/out of car . The pain is significantly impacting  their ability to perform activities of daily living. Patient reports no longer able to do activities such as walking in park.      Focused History  PMH: Reviewed and PE/DVT: no  PSH: Reviewed , Hip/Knee replacement: no, Hip/Knee surgery: no, Anesthesia complications: no, Spine surgery: no, Surgical infection: no, and Weight loss surgery: no  Meds: Reviewed, Current Anticoagulants: no, Weight loss medication: no, and Current Opioids: no  Allergies: Reviewed  and The patient reports no contraindications or allergies to cephalosporins, aspirin, NSAIDs or opioids, except as noted above.  FH: No family history of any bleeding or clotting disorders.  SHx: Reviewed, Occupation: retired, Current smoker: no, EtOH intake weekly: wine, Social support: unk, and Preferred physical activities: swim, pool  Dental Hx: Last routine cleaning: 10/10/24 and Discussed that all invasive dental work must be completed at least 3 months prior to joint replacement surgery. Patient understands they are to avoid any invasive dental work 3-6 months post-surgically.   Bahai: no    HISTORY  PROMs   No questionnaires on file.     Medical History[1]    Medical History[2]  Documented in chart and reviewed.     Surgical History[3]    Allergies: She is allergic to morphine and adhesive tape-silicones.     Medications:  Current Outpatient Medications   Medication Instructions   • acetaminophen (TYLENOL 8 HOUR) 650 mg, 2 times daily   • amLODIPine (NORVASC) 5 mg, oral, 2 times daily   • ascorbic acid (Vitamin C) 1,000 mg tablet Every 24 hours   • atorvastatin (LIPITOR) 20 mg, oral, Daily   • cholecalciferol (VITAMIN D-3) 25 mcg, oral, Daily   • diphenhydrAMINE-acetaminophen (Tylenol PM Extra Strength)  mg per tablet Every 24 hours   • glucosamine HCl 1,500 mg tablet 1 tablet, Daily RT   • Lactobacillus acidophilus 10 billion cell capsule 1 capsule, Daily RT   • magnesium glycinate 300 mg, oral, Daily   • multivit-min-folic acid-vit K  (Multi For Her 50 Plus) 400-80 mcg capsule as directed Orally   • olmesartan-hydrochlorothiazide (BENIcar HCT) 40-12.5 mg tablet 1 tablet, oral, Daily   • saccharomyces boulardii (Florastor) 250 mg capsule 1 capsule, Every 12 hours   • TURMERIC ORAL Take by mouth.   • venlafaxine XR (EFFEXOR-XR) 150 mg, oral, Daily       Family History[4]  Documented in chart and reviewed.     Social History     Tobacco Use   • Smoking status: Never   • Smokeless tobacco: Never   Substance Use Topics   • Alcohol use: Not Currently        Review of Systems: Review of systems completed with medical assistant intake. Please refer to this note.     Physical Exam:  BMI: 38.    General: The patient is well appearing and has an appropriate affect.      Neurological Examination: SILT in SPN/DPN/Sural/Saphenous/Tibial nerves. 5/5 EHL, FHL, Tibial anterior, Gastrocnemius. Coordination grossly intact.      Cardiovascular Exam: Capillary refill <2 seconds. No edema. No varicose veins.      Lymphatic Examination: There is no obvious lymphatic swelling present around the involved joint.     Skin Exam: Skin around the pertinent joint is without evidence of infection or rash.     Gait: The patient ambulates with a coxalgic gait.      Lumbar spine:     No tenderness to palpation midline.     Negative straight leg raise bilaterally.     Right Hip Examination:  Gait: Coxalgic gait.     Examination of the hip reveals the skin to be intact.     There is mild tenderness over the greater trochanter.     There is no obvious swelling.     There is a positive Stinchfield test.     Range of motion is: full extension to 95 degrees of flexion.     The hip internally rotates to 15 degrees and externally rotates to 40 degrees.     Abduction is 40 degrees and adduction is 20 degrees.     There is groin and buttock pain with hip motion.     There is a negative straight leg raise.     Abductor strength 4/5.     Left Hip Examination:  Examination of the hip reveals  the skin to be intact.     There is no tenderness over the greater trochanter.     There is no obvious swelling.     There is a negative Stinchfield test.     Range of motion is full extension to 95 degrees of flexion.     The hip internally rotates to 20 and externally rotates 40 degrees.     Abduction is 50 degrees and adduction is 20 degrees.     There is no groin and buttock pain with hip motion.     There is a negative straight leg raise.     Abductor strength 4+/5.     Right Knee Examination:  Examination of the right knee reveals the skin to be intact. There is no obvious swelling.     There is no tenderness to palpation.     Range of motion is full extension to 120 degrees of flexion.     The knee is stable.     There is no grinding with range of motion.     There is no patellofemoral crepitus.     Left Knee Examination:  Examination of the left knee reveals the skin to be intact. There is no obvious swelling.     There is no tenderness to palpation.     Range of motion is full extension to 120 degrees of flexion.     The knee is stable.     There is no grinding with range of motion.     There is no patellofemoral crepitus.    Imaging:    Radiographs were personally reviewed today. There is evidence of moderate RIGHT  knee osteoarthritis with near LATERAL  bone on bone apposition.    Impression and Plan:  This patient is here for follow-up evaluation of RIGHT knee.    DIAGNOSIS  Primary osteoarthritis of right knee     I have discussed the options in detail with the patient. We have discussed anti-inflammatory medication, activity modification, physical therapy, corticosteroid injections, viscosupplementation injections, partial knee replacement surgery and total knee replacement surgery. Patient is responding well to nonsurgical treatment measures.    The risks and benefits of all these treatment options have been discussed in detail.     The patient has tried at least 3 months of the above conservative  treatments and continues to have disabling pain, impaired activities of daily living and worsened quality of life.  Reviewed the surgical optimization steps to optimize their chances for a successful joint replacement surgery.      Currently their BMI is 38.  Discussed that obesity is a risk factor for continued progression of osteoarthritis. Each pound of weight loss offloads their hip and knee joints by 3-6 pounds.  The most effective of these options is weight loss mainly through restricting caloric intake.     A physical therapy prescription was ordered for the patient.  Patient will continue their home exercise program. Strategies for pain management using over-the-counter anti-inflammatory medications reviewed.  The patient elects for a steroid injection, which was provided according to procedure note below. Discussed utility of brace. Will defer brace at this time.. Encouraged them to maintain range of motion and strength around the knee joints.  They will continue to implement these strategies in addressing their pain.      Recommend the patient continue optimizing nonsurgical treatment interventions as outlined above for management of their knee arthritis.  I would be happy to see them again at any point to discuss surgery if indicated or they are more optimized or to review progress with nonsurgical treatment of arthritis. The patient verbalizes understanding with the recommendations and treatment plan as outlined above and is in agreement.  Questions were addressed.    RTC: as needed    X-rays at next visit: as needed    Large Joint Injection 37588: Knee  Consent given by: Patient  Timeout: Immediately prior to procedure the correct patient, procedure, and site was verified. Sterile gloves and semi-sterile technique were used.   Indications: Knee pain and joint swelling.   Location: RIGHT knee  Needle size: 22 G  Approach: Lateral    Medications administered: Please refer to medical assistant note for lot  number and expiration date.   Subcutaneous   4 ml of 1% lidocaine     Deep   4 ml of 1% lidocaine   4 mL 0.5% marcaine   1 mL of 40 mg kenalog     Patient tolerance: Dressing applied. Patient tolerated the procedure well with no immediate complications.    L Inj/Asp: R knee on 8/8/2025 6:24 PM  Indications: pain and joint swelling  Details: 22 G needle, lateral approach  Medications: 40 mg triamcinolone acetonide 40 mg/mL; 8 mL lidocaine 10 mg/mL (1 %); 4 mL BUPivacaine HCl 0.5 % (5 mg/mL)  Outcome: tolerated well, no immediate complications  Procedure, treatment alternatives, risks and benefits explained, specific risks discussed. Consent was given by the patient. Immediately prior to procedure a time out was called to verify the correct patient, procedure, equipment, support staff and site/side marked as required. Patient was prepped and draped in the usual sterile fashion.             _____________________  Cristine Perez MD   Attending Orthopaedic Surgeon  [facility]    [facility]    This office note was transcribed with dictation software.  Please excuse any typographical errors, program misunderstandings leading to inadvertent insertions or deletions of inappropriate wording, pronoun errors and other unintentional transcription errors not noticed on proof-reading.      _____________  Cristine Perez MD   Attending Orthopaedic Surgeon  [facility]    [facility]       This office note was transcribed with dictation software.  Please excuse any typographical errors, program misunderstandings leading to inadvertent insertions or deletions of inappropriate wording, pronoun errors and other unintentional transcription errors not noticed on proof-reading.                                      [1]  Past Medical History:  Diagnosis Date   • Arthritis 01/01/2000   • Basal cell carcinoma (BCC)    • Cataract    •  Dry eyes    • Femoral acetabular impingement    • Femoral acetabular impingement    • Hypertension 01/01/2023   • Osteoarthritis    • PVD (posterior vitreous detachment), both eyes    [2]  Past Medical History:  Diagnosis Date   • Arthritis 01/01/2000   • Basal cell carcinoma (BCC)    • Cataract    • Dry eyes    • Femoral acetabular impingement    • Femoral acetabular impingement    • Hypertension 01/01/2023   • Osteoarthritis    • PVD (posterior vitreous detachment), both eyes    [3]  Past Surgical History:  Procedure Laterality Date   • CAPSULOTOMY Bilateral 2010   • CATARACT EXTRACTION W/  INTRAOCULAR LENS IMPLANT Bilateral 2008   • HERNIA REPAIR  2013   • HYSTERECTOMY  2006   • OOPHORECTOMY  2006   • OTHER SURGICAL HISTORY  12/07/2020    Inguinal hernia repair   • SKIN BIOPSY  01/06/2024   [4]  Family History  Problem Relation Name Age of Onset   • Cancer Mother Omaira Ramirez (Mother)    • Breast cancer Mother Omaira Ramirez (Mother)    • Diabetes Mother Omaira Ramirez (Mother)    • Cancer Father Xavier BellNaylating (Father)    • Breast cancer Maternal Grandmother Cyndy Liu    • Cancer Brother Xavier Ramirez    • Cancer Brother Xvaier Ramirez

## 2025-08-09 DIAGNOSIS — E78.9 LIPID DISORDER: ICD-10-CM

## 2025-08-10 RX ORDER — TRIAMCINOLONE ACETONIDE 40 MG/ML
40 INJECTION, SUSPENSION INTRA-ARTICULAR; INTRAMUSCULAR
Status: COMPLETED | OUTPATIENT
Start: 2025-08-08 | End: 2025-08-08

## 2025-08-10 RX ORDER — BUPIVACAINE HYDROCHLORIDE 5 MG/ML
4 INJECTION, SOLUTION PERINEURAL
Status: COMPLETED | OUTPATIENT
Start: 2025-08-08 | End: 2025-08-08

## 2025-08-10 RX ORDER — LIDOCAINE HYDROCHLORIDE 10 MG/ML
8 INJECTION, SOLUTION INFILTRATION; PERINEURAL
Status: COMPLETED | OUTPATIENT
Start: 2025-08-08 | End: 2025-08-08

## 2025-08-18 ASSESSMENT — EXTERNAL EXAM - LEFT EYE: OS_EXAM: NORMAL

## 2025-08-18 ASSESSMENT — CUP TO DISC RATIO
OS_RATIO: 0.3
OD_RATIO: 0.3

## 2025-08-18 ASSESSMENT — SLIT LAMP EXAM - LIDS
COMMENTS: GOOD POSITION
COMMENTS: GOOD POSITION

## 2025-08-18 ASSESSMENT — EXTERNAL EXAM - RIGHT EYE: OD_EXAM: NORMAL

## 2025-08-21 ENCOUNTER — OFFICE VISIT (OUTPATIENT)
Dept: DERMATOLOGY | Facility: CLINIC | Age: 75
End: 2025-08-21
Payer: MEDICARE

## 2025-08-21 DIAGNOSIS — Z12.83 SCREENING EXAM FOR SKIN CANCER: ICD-10-CM

## 2025-08-21 DIAGNOSIS — D22.9 MULTIPLE BENIGN NEVI: Primary | ICD-10-CM

## 2025-08-21 DIAGNOSIS — L81.4 LENTIGO: ICD-10-CM

## 2025-08-21 DIAGNOSIS — Z85.828 PERSONAL HISTORY OF SKIN CANCER: ICD-10-CM

## 2025-08-21 DIAGNOSIS — L82.0 INFLAMED SEBORRHEIC KERATOSIS: ICD-10-CM

## 2025-08-21 DIAGNOSIS — L82.1 SEBORRHEIC KERATOSIS: ICD-10-CM

## 2025-08-21 PROCEDURE — 17110 DESTRUCTION B9 LES UP TO 14: CPT

## 2025-08-21 PROCEDURE — 1159F MED LIST DOCD IN RCRD: CPT | Performed by: DERMATOLOGY

## 2025-08-21 PROCEDURE — 99213 OFFICE O/P EST LOW 20 MIN: CPT | Performed by: DERMATOLOGY

## 2025-08-21 ASSESSMENT — DERMATOLOGY QUALITY OF LIFE (QOL) ASSESSMENT
RATE HOW BOTHERED YOU ARE BY EFFECTS OF YOUR SKIN PROBLEMS ON YOUR ACTIVITIES (EG, GOING OUT, ACCOMPLISHING WHAT YOU WANT, WORK ACTIVITIES OR YOUR RELATIONSHIPS WITH OTHERS): 0 - NEVER BOTHERED
DATE THE QUALITY-OF-LIFE ASSESSMENT WAS COMPLETED: 67438
RATE HOW EMOTIONALLY BOTHERED YOU ARE BY YOUR SKIN PROBLEM (FOR EXAMPLE, WORRY, EMBARRASSMENT, FRUSTRATION): 0 - NEVER BOTHERED
RATE HOW BOTHERED YOU ARE BY SYMPTOMS OF YOUR SKIN PROBLEM (EG, ITCHING, STINGING BURNING, HURTING OR SKIN IRRITATION): 0 - NEVER BOTHERED
ARE THERE EXCLUSIONS OR EXCEPTIONS FOR THE QUALITY OF LIFE ASSESSMENT: NO

## 2025-08-21 ASSESSMENT — ITCH NUMERIC RATING SCALE: HOW SEVERE IS YOUR ITCHING?: 0

## 2025-08-21 ASSESSMENT — DERMATOLOGY PATIENT ASSESSMENT
WHERE ARE THESE NEW OR CHANGING LESIONS LOCATED: RT KNEE
DO YOU HAVE ANY NEW OR CHANGING LESIONS: YES

## 2025-08-21 ASSESSMENT — PATIENT GLOBAL ASSESSMENT (PGA): PATIENT GLOBAL ASSESSMENT: PATIENT GLOBAL ASSESSMENT:  1 - CLEAR

## 2025-08-26 ENCOUNTER — APPOINTMENT (OUTPATIENT)
Dept: OPHTHALMOLOGY | Facility: CLINIC | Age: 75
End: 2025-08-26
Payer: MEDICARE

## 2025-08-26 DIAGNOSIS — Z96.1 PSEUDOPHAKIA: ICD-10-CM

## 2025-08-26 DIAGNOSIS — H43.813 PVD (POSTERIOR VITREOUS DETACHMENT), BOTH EYES: ICD-10-CM

## 2025-08-26 DIAGNOSIS — H04.123 DRY EYES, BILATERAL: Primary | ICD-10-CM

## 2025-08-26 PROCEDURE — 92014 COMPRE OPH EXAM EST PT 1/>: CPT | Performed by: OPHTHALMOLOGY

## 2025-08-26 ASSESSMENT — REFRACTION_MANIFEST
OS_ADD: +2.50
OD_SPHERE: PLANO
OS_AXIS: 090
OS_SPHERE: +0.25
OD_ADD: +2.50
OS_CYLINDER: -0.25
OD_CYLINDER: SPHERE

## 2025-08-26 ASSESSMENT — ENCOUNTER SYMPTOMS
CARDIOVASCULAR NEGATIVE: 0
EYES NEGATIVE: 1
GASTROINTESTINAL NEGATIVE: 0
RESPIRATORY NEGATIVE: 0
ENDOCRINE NEGATIVE: 0
MUSCULOSKELETAL NEGATIVE: 0
HEMATOLOGIC/LYMPHATIC NEGATIVE: 0
NEUROLOGICAL NEGATIVE: 0
CONSTITUTIONAL NEGATIVE: 0
ALLERGIC/IMMUNOLOGIC NEGATIVE: 0
PSYCHIATRIC NEGATIVE: 0

## 2025-08-26 ASSESSMENT — TONOMETRY
OD_IOP_MMHG: 13
OS_IOP_MMHG: 12
IOP_METHOD: GOLDMANN APPLANATION

## 2025-08-26 ASSESSMENT — REFRACTION_WEARINGRX
OS_SPHERE: OTC NVO
OD_SPHERE: OTC NVO

## 2025-08-26 ASSESSMENT — VISUAL ACUITY
METHOD: SNELLEN - LINEAR
OD_SC: 20/20-1
OS_SC: 20/20

## 2025-08-27 LAB
ALT SERPL-CCNC: 18 U/L (ref 6–29)
AST SERPL-CCNC: 17 U/L (ref 10–35)
CHOLEST SERPL-MCNC: 223 MG/DL
CHOLEST/HDLC SERPL: 2.8 (CALC)
HDLC SERPL-MCNC: 80 MG/DL
LDLC SERPL CALC-MCNC: 113 MG/DL (CALC)
NONHDLC SERPL-MCNC: 143 MG/DL (CALC)
TRIGL SERPL-MCNC: 187 MG/DL

## 2025-09-03 ENCOUNTER — APPOINTMENT (OUTPATIENT)
Dept: RADIOLOGY | Facility: CLINIC | Age: 75
End: 2025-09-03
Payer: MEDICARE

## 2025-09-06 ENCOUNTER — RESULTS FOLLOW-UP (OUTPATIENT)
Dept: PRIMARY CARE | Facility: CLINIC | Age: 75
End: 2025-09-06
Payer: MEDICARE

## 2025-10-07 ENCOUNTER — APPOINTMENT (OUTPATIENT)
Facility: CLINIC | Age: 75
End: 2025-10-07
Payer: MEDICARE

## 2026-02-17 ENCOUNTER — APPOINTMENT (OUTPATIENT)
Dept: DERMATOLOGY | Facility: CLINIC | Age: 76
End: 2026-02-17
Payer: MEDICARE

## 2026-08-25 ENCOUNTER — APPOINTMENT (OUTPATIENT)
Dept: DERMATOLOGY | Facility: CLINIC | Age: 76
End: 2026-08-25
Payer: MEDICARE

## 2026-09-22 ENCOUNTER — APPOINTMENT (OUTPATIENT)
Dept: OPHTHALMOLOGY | Facility: CLINIC | Age: 76
End: 2026-09-22
Payer: MEDICARE